# Patient Record
Sex: FEMALE | Race: ASIAN | NOT HISPANIC OR LATINO | URBAN - METROPOLITAN AREA
[De-identification: names, ages, dates, MRNs, and addresses within clinical notes are randomized per-mention and may not be internally consistent; named-entity substitution may affect disease eponyms.]

---

## 2017-06-28 ENCOUNTER — EMERGENCY (EMERGENCY)
Facility: HOSPITAL | Age: 42
LOS: 1 days | Discharge: HOME | End: 2017-06-28
Attending: EMERGENCY MEDICINE | Admitting: EMERGENCY MEDICINE
Payer: MEDICAID

## 2017-06-28 VITALS
OXYGEN SATURATION: 100 % | SYSTOLIC BLOOD PRESSURE: 115 MMHG | HEART RATE: 70 BPM | TEMPERATURE: 98 F | RESPIRATION RATE: 18 BRPM | DIASTOLIC BLOOD PRESSURE: 79 MMHG

## 2017-06-28 PROCEDURE — 99284 EMERGENCY DEPT VISIT MOD MDM: CPT | Mod: 25

## 2017-06-28 NOTE — ED ADULT TRIAGE NOTE - CHIEF COMPLAINT QUOTE
Pt comes in c/o allergic reaction to hair dye that started 2 days ago. Pt has swollen b/l swollen eyes, almost swollen shut, and swollen areas around hair line. Pt denies any CP or SOB, swelling of lips/tongue, itchy throat. Breathing is equal and unlabored on assessment, speaking in complete sentences, O2 sat 100% on RA. NAD noted.

## 2017-06-29 DIAGNOSIS — Z90.49 ACQUIRED ABSENCE OF OTHER SPECIFIED PARTS OF DIGESTIVE TRACT: Chronic | ICD-10-CM

## 2017-06-29 RX ORDER — DIPHENHYDRAMINE HCL 50 MG
50 CAPSULE ORAL ONCE
Qty: 0 | Refills: 0 | Status: COMPLETED | OUTPATIENT
Start: 2017-06-29 | End: 2017-06-29

## 2017-06-29 RX ORDER — FAMOTIDINE 10 MG/ML
20 INJECTION INTRAVENOUS ONCE
Qty: 0 | Refills: 0 | Status: COMPLETED | OUTPATIENT
Start: 2017-06-29 | End: 2017-06-29

## 2017-06-29 RX ADMIN — FAMOTIDINE 20 MILLIGRAM(S): 10 INJECTION INTRAVENOUS at 02:32

## 2017-06-29 RX ADMIN — Medication 125 MILLIGRAM(S): at 02:32

## 2017-06-29 RX ADMIN — Medication 50 MILLIGRAM(S): at 02:32

## 2017-06-29 NOTE — ED PROVIDER NOTE - ATTENDING CONTRIBUTION TO CARE
DR. STRAUSS, ATTENDING MD-  I performed a face to face bedside interview with patient regarding history of present illness, review of symptoms and past medical history. I completed an independent physical exam.  I have discussed patient's plan of care with the resident.   Documentation as above in the note.  HPI: 43 yo F with no Past Medical History that presents with allergic reaction, eye swelling x 2 days from possible hair dye. Had macarena hair dye bought OTC and started having symptoms. Denies SOB, wheezing, chest pain, abd pain, N/V/D, weakness. Saw PMD and given benadryl which was working but today not working and eyes welling, unable to see out of left eye secondary to swelling. No meds taken prior to arrival. Still has hair dye in hair despite trying to wash out 6 times. Has had this before to same hair dye 10 years ago. No other exposures.   EXAM: Hair dye in place and on scalp. Eyes bilateral swelling but eyes EOMI, PERRL, no conjunctivitis. Lungs ctab. no stridor, no wheezing, abd soft non tender. no rash/hives.   MDM: 43 yo F with no Past Medical History that presents with allergic reaction which only involves eye lid swelling on exam. Well appearing but still with hair dye in hair. Will need to wash out hair dye, provide meds including benadryl and steroids and reassess.

## 2017-06-29 NOTE — ED PROVIDER NOTE - OBJECTIVE STATEMENT
42F no sig PMH, p/w 2 days of facial swelling and itching after using macarena hair dye. Pt's eyes are swollen and limiting sight. Endorses mild SOB yesterday and some SOB today with laying down. Denies abdominal pain, n/v/d, sob, chest pain currently.  No hives. Pt has had this happen 10 years ago, was treated in [facility] with unknown med cocktail.      at bedside.

## 2017-06-29 NOTE — ED PROVIDER NOTE - CARE PLAN
Principal Discharge DX:	Allergic reaction, initial encounter  Instructions for follow-up, activity and diet:	Follow up with your primary care doctor in two days  - Take benadryl 50mg every 8 hours as needed for facial swelling or itching  - Take prednisone 40mg daily  - Use clarifying shampoo in your hair daily, if you cannot find one you can add baking soda to your regular shampoo.  - Return to the ED if you have severe swelling, shortness of breath, chest pain, shortness of breath, abdominal pain, vomiting, diarrhea or any other concerning symptom. Principal Discharge DX:	Allergic reaction, initial encounter  Instructions for follow-up, activity and diet:	Follow up with your primary care doctor in two days  - Take benadryl 50mg every 8 hours as needed for facial swelling or itching. If you take benadryl you should not breast feed for 12 hours after taking.  - Take prednisone 40mg daily. Wait 4 hours to breast feed after taking prednisone dose.  - Use clarifying shampoo in your hair daily, if you cannot find one you can add baking soda to your regular shampoo.  - Return to the ED if you have severe swelling, shortness of breath, chest pain, shortness of breath, abdominal pain, vomiting, diarrhea or any other concerning symptom.

## 2017-06-29 NOTE — ED ADULT NURSE NOTE - CHPI ED SYMPTOMS NEG
no difficulty breathing/no throat itching/no vomiting/no nausea/no rash/no difficulty swallowing/no shortness of breath/no cough/no wheezing

## 2017-06-29 NOTE — ED PROVIDER NOTE - MEDICAL DECISION MAKING DETAILS
42F PMH hair dye allergy p/w facial edema 2/2 hair dye use. Will give IV steroids, benadryl, pepcid Will attempt to wash out hairdye. Recommended shaving head however pt does not want to.

## 2017-06-29 NOTE — ED ADULT NURSE NOTE - OBJECTIVE STATEMENT
Pt arrives to ED s/p using hair dye 2 days ago.  Pt had allergic reaction with swelling to face and eyes.  Pt reports pain to area.  No respiratory distress observed or reported.  Pt is Telugu speaking and requests family at bedside to translate.  Pt is aware she is allergic to hair dye but tried a new brand.  Pt denies allergies to food or drugs.  Pt to be assessed by MD.

## 2017-06-29 NOTE — ED PROVIDER NOTE - PLAN OF CARE
Follow up with your primary care doctor in two days  - Take benadryl 50mg every 8 hours as needed for facial swelling or itching  - Take prednisone 40mg daily  - Use clarifying shampoo in your hair daily, if you cannot find one you can add baking soda to your regular shampoo.  - Return to the ED if you have severe swelling, shortness of breath, chest pain, shortness of breath, abdominal pain, vomiting, diarrhea or any other concerning symptom. Follow up with your primary care doctor in two days  - Take benadryl 50mg every 8 hours as needed for facial swelling or itching. If you take benadryl you should not breast feed for 12 hours after taking.  - Take prednisone 40mg daily. Wait 4 hours to breast feed after taking prednisone dose.  - Use clarifying shampoo in your hair daily, if you cannot find one you can add baking soda to your regular shampoo.  - Return to the ED if you have severe swelling, shortness of breath, chest pain, shortness of breath, abdominal pain, vomiting, diarrhea or any other concerning symptom.

## 2019-02-15 ENCOUNTER — EMERGENCY (EMERGENCY)
Facility: HOSPITAL | Age: 44
LOS: 1 days | Discharge: HOME | End: 2019-02-15
Attending: EMERGENCY MEDICINE | Admitting: EMERGENCY MEDICINE
Payer: MEDICAID

## 2019-02-15 VITALS
TEMPERATURE: 98 F | DIASTOLIC BLOOD PRESSURE: 88 MMHG | HEART RATE: 83 BPM | SYSTOLIC BLOOD PRESSURE: 122 MMHG | RESPIRATION RATE: 18 BRPM | OXYGEN SATURATION: 100 %

## 2019-02-15 DIAGNOSIS — Z90.49 ACQUIRED ABSENCE OF OTHER SPECIFIED PARTS OF DIGESTIVE TRACT: Chronic | ICD-10-CM

## 2019-02-15 LAB
ALBUMIN SERPL ELPH-MCNC: 3.9 G/DL — SIGNIFICANT CHANGE UP (ref 3.3–5)
ALP SERPL-CCNC: 75 U/L — SIGNIFICANT CHANGE UP (ref 40–120)
ALT FLD-CCNC: 34 U/L — HIGH (ref 4–33)
ANION GAP SERPL CALC-SCNC: 11 MMO/L — SIGNIFICANT CHANGE UP (ref 7–14)
APPEARANCE UR: CLEAR — SIGNIFICANT CHANGE UP
AST SERPL-CCNC: 27 U/L — SIGNIFICANT CHANGE UP (ref 4–32)
BASOPHILS # BLD AUTO: 0.02 K/UL — SIGNIFICANT CHANGE UP (ref 0–0.2)
BASOPHILS NFR BLD AUTO: 0.5 % — SIGNIFICANT CHANGE UP (ref 0–2)
BILIRUB SERPL-MCNC: < 0.2 MG/DL — LOW (ref 0.2–1.2)
BILIRUB UR-MCNC: NEGATIVE — SIGNIFICANT CHANGE UP
BLOOD UR QL VISUAL: NEGATIVE — SIGNIFICANT CHANGE UP
BUN SERPL-MCNC: 12 MG/DL — SIGNIFICANT CHANGE UP (ref 7–23)
CALCIUM SERPL-MCNC: 8.6 MG/DL — SIGNIFICANT CHANGE UP (ref 8.4–10.5)
CHLORIDE SERPL-SCNC: 104 MMOL/L — SIGNIFICANT CHANGE UP (ref 98–107)
CO2 SERPL-SCNC: 24 MMOL/L — SIGNIFICANT CHANGE UP (ref 22–31)
COLOR SPEC: COLORLESS — SIGNIFICANT CHANGE UP
CREAT SERPL-MCNC: 0.98 MG/DL — SIGNIFICANT CHANGE UP (ref 0.5–1.3)
EOSINOPHIL # BLD AUTO: 0.1 K/UL — SIGNIFICANT CHANGE UP (ref 0–0.5)
EOSINOPHIL NFR BLD AUTO: 2.5 % — SIGNIFICANT CHANGE UP (ref 0–6)
FLU A RESULT: NOT DETECTED — SIGNIFICANT CHANGE UP
FLU A RESULT: NOT DETECTED — SIGNIFICANT CHANGE UP
FLUAV AG NPH QL: NOT DETECTED — SIGNIFICANT CHANGE UP
FLUBV AG NPH QL: NOT DETECTED — SIGNIFICANT CHANGE UP
GLUCOSE SERPL-MCNC: 98 MG/DL — SIGNIFICANT CHANGE UP (ref 70–99)
GLUCOSE UR-MCNC: NEGATIVE — SIGNIFICANT CHANGE UP
HCT VFR BLD CALC: 34.9 % — SIGNIFICANT CHANGE UP (ref 34.5–45)
HGB BLD-MCNC: 11.4 G/DL — LOW (ref 11.5–15.5)
IMM GRANULOCYTES NFR BLD AUTO: 0.2 % — SIGNIFICANT CHANGE UP (ref 0–1.5)
KETONES UR-MCNC: NEGATIVE — SIGNIFICANT CHANGE UP
LEUKOCYTE ESTERASE UR-ACNC: NEGATIVE — SIGNIFICANT CHANGE UP
LYMPHOCYTES # BLD AUTO: 1.41 K/UL — SIGNIFICANT CHANGE UP (ref 1–3.3)
LYMPHOCYTES # BLD AUTO: 35 % — SIGNIFICANT CHANGE UP (ref 13–44)
MCHC RBC-ENTMCNC: 29.3 PG — SIGNIFICANT CHANGE UP (ref 27–34)
MCHC RBC-ENTMCNC: 32.7 % — SIGNIFICANT CHANGE UP (ref 32–36)
MCV RBC AUTO: 89.7 FL — SIGNIFICANT CHANGE UP (ref 80–100)
MONOCYTES # BLD AUTO: 0.31 K/UL — SIGNIFICANT CHANGE UP (ref 0–0.9)
MONOCYTES NFR BLD AUTO: 7.7 % — SIGNIFICANT CHANGE UP (ref 2–14)
NEUTROPHILS # BLD AUTO: 2.18 K/UL — SIGNIFICANT CHANGE UP (ref 1.8–7.4)
NEUTROPHILS NFR BLD AUTO: 54.1 % — SIGNIFICANT CHANGE UP (ref 43–77)
NITRITE UR-MCNC: NEGATIVE — SIGNIFICANT CHANGE UP
NRBC # FLD: 0 K/UL — LOW (ref 25–125)
PH UR: 8 — SIGNIFICANT CHANGE UP (ref 5–8)
PLATELET # BLD AUTO: 186 K/UL — SIGNIFICANT CHANGE UP (ref 150–400)
PMV BLD: 12.2 FL — SIGNIFICANT CHANGE UP (ref 7–13)
POTASSIUM SERPL-MCNC: 4.5 MMOL/L — SIGNIFICANT CHANGE UP (ref 3.5–5.3)
POTASSIUM SERPL-SCNC: 4.5 MMOL/L — SIGNIFICANT CHANGE UP (ref 3.5–5.3)
PROT SERPL-MCNC: 7 G/DL — SIGNIFICANT CHANGE UP (ref 6–8.3)
PROT UR-MCNC: NEGATIVE — SIGNIFICANT CHANGE UP
RBC # BLD: 3.89 M/UL — SIGNIFICANT CHANGE UP (ref 3.8–5.2)
RBC # FLD: 12.6 % — SIGNIFICANT CHANGE UP (ref 10.3–14.5)
RSV RESULT: SIGNIFICANT CHANGE UP
RSV RNA RESP QL NAA+PROBE: SIGNIFICANT CHANGE UP
SODIUM SERPL-SCNC: 139 MMOL/L — SIGNIFICANT CHANGE UP (ref 135–145)
SP GR SPEC: 1.01 — SIGNIFICANT CHANGE UP (ref 1–1.04)
UROBILINOGEN FLD QL: NORMAL — SIGNIFICANT CHANGE UP
WBC # BLD: 4.03 K/UL — SIGNIFICANT CHANGE UP (ref 3.8–10.5)
WBC # FLD AUTO: 4.03 K/UL — SIGNIFICANT CHANGE UP (ref 3.8–10.5)

## 2019-02-15 PROCEDURE — 99284 EMERGENCY DEPT VISIT MOD MDM: CPT

## 2019-02-15 PROCEDURE — 71046 X-RAY EXAM CHEST 2 VIEWS: CPT | Mod: 26

## 2019-02-15 RX ADMIN — Medication 100 MILLIGRAM(S): at 19:14

## 2019-02-15 NOTE — ED PROVIDER NOTE - CLINICAL SUMMARY MEDICAL DECISION MAKING FREE TEXT BOX
Healthy female here with URI symptoms:   -will check flu, RSV, chest xray, give tesslon pearls for cough, reassess

## 2019-02-15 NOTE — ED ADULT TRIAGE NOTE - CHIEF COMPLAINT QUOTE
Pt Serbian speaking, ok for son to translate as per pt.  Pt c/o cough with SOB and CP x1 week and got worse this afternoon.  Denies fever.  Appears comfortable

## 2019-02-15 NOTE — ED PROVIDER NOTE - OBJECTIVE STATEMENT
43 yo F with no PMHx here with productive cough with white sputum, chest tightness, shortness of breath x 6 days. The pt says she began having a fever 6 days which was relieved with motrin. Denies rigors, palpitations, n/v, abdominal pain, paresthesias, weakness. No sick contacts. No recent travel.

## 2019-02-17 LAB
BACTERIA UR CULT: SIGNIFICANT CHANGE UP
SPECIMEN SOURCE: SIGNIFICANT CHANGE UP

## 2021-07-02 PROBLEM — Z00.00 ENCOUNTER FOR PREVENTIVE HEALTH EXAMINATION: Status: ACTIVE | Noted: 2021-07-02

## 2021-07-15 ENCOUNTER — APPOINTMENT (OUTPATIENT)
Dept: CARDIOLOGY | Facility: CLINIC | Age: 46
End: 2021-07-15
Payer: MEDICAID

## 2021-07-15 ENCOUNTER — NON-APPOINTMENT (OUTPATIENT)
Age: 46
End: 2021-07-15

## 2021-07-15 VITALS
OXYGEN SATURATION: 98 % | TEMPERATURE: 97.7 F | HEART RATE: 66 BPM | SYSTOLIC BLOOD PRESSURE: 116 MMHG | HEIGHT: 61 IN | WEIGHT: 166 LBS | DIASTOLIC BLOOD PRESSURE: 86 MMHG | BODY MASS INDEX: 31.34 KG/M2

## 2021-07-15 DIAGNOSIS — Z82.49 FAMILY HISTORY OF ISCHEMIC HEART DISEASE AND OTHER DISEASES OF THE CIRCULATORY SYSTEM: ICD-10-CM

## 2021-07-15 DIAGNOSIS — Z87.09 PERSONAL HISTORY OF OTHER DISEASES OF THE RESPIRATORY SYSTEM: ICD-10-CM

## 2021-07-15 DIAGNOSIS — Z83.3 FAMILY HISTORY OF DIABETES MELLITUS: ICD-10-CM

## 2021-07-15 DIAGNOSIS — R06.02 SHORTNESS OF BREATH: ICD-10-CM

## 2021-07-15 DIAGNOSIS — Z83.438 FAMILY HISTORY OF OTHER DISORDER OF LIPOPROTEIN METABOLISM AND OTHER LIPIDEMIA: ICD-10-CM

## 2021-07-15 DIAGNOSIS — Z78.9 OTHER SPECIFIED HEALTH STATUS: ICD-10-CM

## 2021-07-15 PROCEDURE — 93000 ELECTROCARDIOGRAM COMPLETE: CPT

## 2021-07-15 PROCEDURE — 93306 TTE W/DOPPLER COMPLETE: CPT

## 2021-07-15 PROCEDURE — 99204 OFFICE O/P NEW MOD 45 MIN: CPT

## 2021-07-15 RX ORDER — CALCIUM CITRATE/VITAMIN D3 315MG-6.25
TABLET ORAL
Refills: 0 | Status: ACTIVE | COMMUNITY

## 2021-07-15 NOTE — HISTORY OF PRESENT ILLNESS
[FreeTextEntry1] : Yuan Márquez is a 46 year old female with history of on and off hypertension not on medications comes for cardiac evaluation. Complaining of random onset of mild shortness of breath and chest discomfort which is relieved by itself in few minutes and chronic. No exertional shortness of breath or chest pains. No palpitations. Takes Calcium tablets and some Asthma medication, does not remember the name. Says eats healthy. Occasional on and off brief episodes of dizziness . No syncope.

## 2021-07-15 NOTE — DISCUSSION/SUMMARY
[FreeTextEntry1] : In a summary Yuan Márquez is a middle aged female with on and off Hypertension, when she eats outside food,  not taking any medication, today BP normal. Monitor BP regularly. Low salt diet. Avoid outside food. Shortness of breath and chest discomfort , and on and off dizziness, when BP high ,echo done showed normal LV systolic function and mild TR. Healthy lifestyle. Exercises. Follow up in 6 months.

## 2021-07-15 NOTE — REVIEW OF SYSTEMS
[SOB] : shortness of breath [Chest Discomfort] : chest discomfort [Dizziness] : dizziness [Negative] : Respiratory [Blurry Vision] : no blurred vision [Earache] : no earache [Lower Ext Edema] : no extremity edema [Palpitations] : no palpitations [Orthopnea] : no orthopnea [PND] : no PND [Abdominal Pain] : no abdominal pain [Nausea] : no nausea [Vomiting] : no vomiting [Heartburn] : no heartburn [Dysuria] : no dysuria [Joint Pain] : no joint pain [Rash] : no rash [Itching] : no itching [Tremor] : no tremor was seen [Numbness (Hypoesthesia)] : no numbness [Tingling (Paresthesia)] : no tingling [Confusion] : no confusion was observed [Anxiety] : no anxiety [Under Stress] : not under stress [Easy Bleeding] : no tendency for easy bleeding [Easy Bruising] : no tendency for easy bruising

## 2022-01-19 ENCOUNTER — NON-APPOINTMENT (OUTPATIENT)
Age: 47
End: 2022-01-19

## 2022-01-19 ENCOUNTER — APPOINTMENT (OUTPATIENT)
Dept: CARDIOLOGY | Facility: CLINIC | Age: 47
End: 2022-01-19
Payer: MEDICAID

## 2022-01-19 VITALS
WEIGHT: 166 LBS | HEIGHT: 61 IN | OXYGEN SATURATION: 98 % | HEART RATE: 66 BPM | SYSTOLIC BLOOD PRESSURE: 110 MMHG | BODY MASS INDEX: 31.34 KG/M2 | TEMPERATURE: 98.3 F | DIASTOLIC BLOOD PRESSURE: 78 MMHG

## 2022-01-19 DIAGNOSIS — R42 DIZZINESS AND GIDDINESS: ICD-10-CM

## 2022-01-19 DIAGNOSIS — Z86.79 PERSONAL HISTORY OF OTHER DISEASES OF THE CIRCULATORY SYSTEM: ICD-10-CM

## 2022-01-19 PROCEDURE — 93000 ELECTROCARDIOGRAM COMPLETE: CPT

## 2022-01-19 PROCEDURE — 99213 OFFICE O/P EST LOW 20 MIN: CPT

## 2022-01-19 NOTE — DISCUSSION/SUMMARY
[FreeTextEntry1] : In a summary Yuan Márquez is a middle aged female with on and off dizziness and episodes of BP on low side, eat regular food. Drink plenty of fluids.  Healthy lifestyle. Exercises. Follow up as needed.

## 2022-01-19 NOTE — PHYSICAL EXAM
APER [Normal Conjunctiva] : normal conjunctiva [No Carotid Bruit] : no carotid bruit [Soft] : abdomen soft [Non Tender] : non-tender [Normal Bowel Sounds] : normal bowel sounds [Normal] : alert and oriented, normal memory

## 2022-01-19 NOTE — REVIEW OF SYSTEMS
[Dizziness] : dizziness [Negative] : Respiratory [Blurry Vision] : no blurred vision [Earache] : no earache [SOB] : no shortness of breath [Chest Discomfort] : no chest discomfort [Lower Ext Edema] : no extremity edema [Palpitations] : no palpitations [Orthopnea] : no orthopnea [PND] : no PND [Abdominal Pain] : no abdominal pain [Nausea] : no nausea [Vomiting] : no vomiting [Heartburn] : no heartburn [Dysuria] : no dysuria [Joint Pain] : no joint pain [Rash] : no rash [Itching] : no itching [Tremor] : no tremor was seen [Numbness (Hypoesthesia)] : no numbness [Tingling (Paresthesia)] : no tingling [Confusion] : no confusion was observed [Anxiety] : no anxiety [Under Stress] : not under stress [Easy Bleeding] : no tendency for easy bleeding [Easy Bruising] : no tendency for easy bruising

## 2022-09-29 ENCOUNTER — INPATIENT (INPATIENT)
Facility: HOSPITAL | Age: 47
LOS: 0 days | Discharge: LEFT BEFORE DISCHARGE | End: 2022-09-30
Attending: STUDENT IN AN ORGANIZED HEALTH CARE EDUCATION/TRAINING PROGRAM | Admitting: STUDENT IN AN ORGANIZED HEALTH CARE EDUCATION/TRAINING PROGRAM
Payer: MEDICAID

## 2022-09-29 VITALS
RESPIRATION RATE: 16 BRPM | SYSTOLIC BLOOD PRESSURE: 136 MMHG | OXYGEN SATURATION: 100 % | HEART RATE: 68 BPM | TEMPERATURE: 98 F | DIASTOLIC BLOOD PRESSURE: 79 MMHG

## 2022-09-29 DIAGNOSIS — Z90.49 ACQUIRED ABSENCE OF OTHER SPECIFIED PARTS OF DIGESTIVE TRACT: Chronic | ICD-10-CM

## 2022-09-29 LAB
ALBUMIN SERPL ELPH-MCNC: 4 G/DL — SIGNIFICANT CHANGE UP (ref 3.3–5)
ALP SERPL-CCNC: 74 U/L — SIGNIFICANT CHANGE UP (ref 40–120)
ALT FLD-CCNC: 30 U/L — SIGNIFICANT CHANGE UP (ref 4–33)
ANION GAP SERPL CALC-SCNC: 8 MMOL/L — SIGNIFICANT CHANGE UP (ref 7–14)
APPEARANCE UR: CLEAR — SIGNIFICANT CHANGE UP
AST SERPL-CCNC: 31 U/L — SIGNIFICANT CHANGE UP (ref 4–32)
BASOPHILS # BLD AUTO: 0.03 K/UL — SIGNIFICANT CHANGE UP (ref 0–0.2)
BASOPHILS NFR BLD AUTO: 0.8 % — SIGNIFICANT CHANGE UP (ref 0–2)
BILIRUB SERPL-MCNC: <0.2 MG/DL — SIGNIFICANT CHANGE UP (ref 0.2–1.2)
BILIRUB UR-MCNC: NEGATIVE — SIGNIFICANT CHANGE UP
BUN SERPL-MCNC: 10 MG/DL — SIGNIFICANT CHANGE UP (ref 7–23)
CALCIUM SERPL-MCNC: 8.7 MG/DL — SIGNIFICANT CHANGE UP (ref 8.4–10.5)
CHLORIDE SERPL-SCNC: 102 MMOL/L — SIGNIFICANT CHANGE UP (ref 98–107)
CO2 SERPL-SCNC: 24 MMOL/L — SIGNIFICANT CHANGE UP (ref 22–31)
COLOR SPEC: SIGNIFICANT CHANGE UP
CREAT SERPL-MCNC: 0.69 MG/DL — SIGNIFICANT CHANGE UP (ref 0.5–1.3)
DIFF PNL FLD: NEGATIVE — SIGNIFICANT CHANGE UP
EGFR: 109 ML/MIN/1.73M2 — SIGNIFICANT CHANGE UP
EOSINOPHIL # BLD AUTO: 0.06 K/UL — SIGNIFICANT CHANGE UP (ref 0–0.5)
EOSINOPHIL NFR BLD AUTO: 1.6 % — SIGNIFICANT CHANGE UP (ref 0–6)
GLUCOSE SERPL-MCNC: 88 MG/DL — SIGNIFICANT CHANGE UP (ref 70–99)
GLUCOSE UR QL: NEGATIVE — SIGNIFICANT CHANGE UP
HCT VFR BLD CALC: 37.1 % — SIGNIFICANT CHANGE UP (ref 34.5–45)
HGB BLD-MCNC: 12.4 G/DL — SIGNIFICANT CHANGE UP (ref 11.5–15.5)
IANC: 1.86 K/UL — SIGNIFICANT CHANGE UP (ref 1.8–7.4)
IMM GRANULOCYTES NFR BLD AUTO: 0.3 % — SIGNIFICANT CHANGE UP (ref 0–0.9)
KETONES UR-MCNC: NEGATIVE — SIGNIFICANT CHANGE UP
LEUKOCYTE ESTERASE UR-ACNC: NEGATIVE — SIGNIFICANT CHANGE UP
LYMPHOCYTES # BLD AUTO: 1.57 K/UL — SIGNIFICANT CHANGE UP (ref 1–3.3)
LYMPHOCYTES # BLD AUTO: 40.6 % — SIGNIFICANT CHANGE UP (ref 13–44)
MCHC RBC-ENTMCNC: 29.8 PG — SIGNIFICANT CHANGE UP (ref 27–34)
MCHC RBC-ENTMCNC: 33.4 GM/DL — SIGNIFICANT CHANGE UP (ref 32–36)
MCV RBC AUTO: 89.2 FL — SIGNIFICANT CHANGE UP (ref 80–100)
MONOCYTES # BLD AUTO: 0.34 K/UL — SIGNIFICANT CHANGE UP (ref 0–0.9)
MONOCYTES NFR BLD AUTO: 8.8 % — SIGNIFICANT CHANGE UP (ref 2–14)
NEUTROPHILS # BLD AUTO: 1.86 K/UL — SIGNIFICANT CHANGE UP (ref 1.8–7.4)
NEUTROPHILS NFR BLD AUTO: 47.9 % — SIGNIFICANT CHANGE UP (ref 43–77)
NITRITE UR-MCNC: NEGATIVE — SIGNIFICANT CHANGE UP
NRBC # BLD: 0 /100 WBCS — SIGNIFICANT CHANGE UP (ref 0–0)
NRBC # FLD: 0 K/UL — SIGNIFICANT CHANGE UP (ref 0–0)
PH UR: 8 — SIGNIFICANT CHANGE UP (ref 5–8)
PLATELET # BLD AUTO: 196 K/UL — SIGNIFICANT CHANGE UP (ref 150–400)
POTASSIUM SERPL-MCNC: 4.1 MMOL/L — SIGNIFICANT CHANGE UP (ref 3.5–5.3)
POTASSIUM SERPL-SCNC: 4.1 MMOL/L — SIGNIFICANT CHANGE UP (ref 3.5–5.3)
PROT SERPL-MCNC: 7 G/DL — SIGNIFICANT CHANGE UP (ref 6–8.3)
PROT UR-MCNC: NEGATIVE — SIGNIFICANT CHANGE UP
RBC # BLD: 4.16 M/UL — SIGNIFICANT CHANGE UP (ref 3.8–5.2)
RBC # FLD: 12.8 % — SIGNIFICANT CHANGE UP (ref 10.3–14.5)
SODIUM SERPL-SCNC: 134 MMOL/L — LOW (ref 135–145)
SP GR SPEC: 1.01 — LOW (ref 1.01–1.05)
TROPONIN T, HIGH SENSITIVITY RESULT: <6 NG/L — SIGNIFICANT CHANGE UP
UROBILINOGEN FLD QL: SIGNIFICANT CHANGE UP
WBC # BLD: 3.87 K/UL — SIGNIFICANT CHANGE UP (ref 3.8–10.5)
WBC # FLD AUTO: 3.87 K/UL — SIGNIFICANT CHANGE UP (ref 3.8–10.5)

## 2022-09-29 PROCEDURE — 93010 ELECTROCARDIOGRAM REPORT: CPT

## 2022-09-29 PROCEDURE — 70496 CT ANGIOGRAPHY HEAD: CPT | Mod: 26,MF

## 2022-09-29 PROCEDURE — 99285 EMERGENCY DEPT VISIT HI MDM: CPT | Mod: 25

## 2022-09-29 PROCEDURE — 70498 CT ANGIOGRAPHY NECK: CPT | Mod: 26,MG

## 2022-09-29 PROCEDURE — G1004: CPT

## 2022-09-29 RX ORDER — ACETAMINOPHEN 500 MG
975 TABLET ORAL ONCE
Refills: 0 | Status: COMPLETED | OUTPATIENT
Start: 2022-09-29 | End: 2022-09-29

## 2022-09-29 RX ORDER — SODIUM CHLORIDE 9 MG/ML
1000 INJECTION INTRAMUSCULAR; INTRAVENOUS; SUBCUTANEOUS ONCE
Refills: 0 | Status: COMPLETED | OUTPATIENT
Start: 2022-09-29 | End: 2022-09-29

## 2022-09-29 RX ORDER — MECLIZINE HCL 12.5 MG
25 TABLET ORAL ONCE
Refills: 0 | Status: COMPLETED | OUTPATIENT
Start: 2022-09-29 | End: 2022-09-29

## 2022-09-29 RX ADMIN — Medication 25 MILLIGRAM(S): at 19:06

## 2022-09-29 RX ADMIN — SODIUM CHLORIDE 1000 MILLILITER(S): 9 INJECTION INTRAMUSCULAR; INTRAVENOUS; SUBCUTANEOUS at 19:06

## 2022-09-29 RX ADMIN — Medication 975 MILLIGRAM(S): at 19:05

## 2022-09-29 NOTE — ED ADULT NURSE NOTE - INTERPRETER NAME
EXAMINATION TYPE: XR chest 2V

 

DATE OF EXAM: 1/5/2018

 

COMPARISON: 12/17/2017

 

HISTORY: Chest pain

 

TECHNIQUE:  Frontal and lateral views of the chest are obtained.

 

FINDINGS:  

 

There is no focal air space opacity.

 

No evidence for pneumothorax.  No pleural effusion.

 

The cardiac silhouette size is within normal limits.

 

The osseous structures are grossly intact.

 

IMPRESSION:  

 

1.  No acute cardiopulmonary process.
Placido

## 2022-09-29 NOTE — ED ADULT TRIAGE NOTE - CHIEF COMPLAINT QUOTE
Pt c/o dizziness x 3 days. Endorses room-spinning sensation, worse with positional changes, generalized weakness and lethargy. No headache, blurry vision, nausea/vomiting, fever/chills. PMHx Asthma

## 2022-09-29 NOTE — ED PROVIDER NOTE - PRINCIPAL DIAGNOSIS
Speech therapy    Orders received and chart reviewed. Spoke with RN. Note patient is currently having a Ihsan placed. Will follow up later today as appropriate. Thank you. Imani Jorgensen M.S., CCC-SLP Dizziness

## 2022-09-29 NOTE — ED PROVIDER NOTE - ATTENDING APP SHARED VISIT CONTRIBUTION OF CARE
harris: 48 yo woman, appears oldr than stated age looks uncomfortable with dizziness and headache.  pt with some nystagmus to the right.  abd soft.  treating HA but will also image.    I performed a history and physical exam of the patient and discussed their management with the resident and /or advanced care provider. I reviewed the resident and /or ACP's note and agree with the documented findings and plan of care. My medical decison making and observations are found above.

## 2022-09-29 NOTE — ED ADULT NURSE NOTE - OBJECTIVE STATEMENT
Received pt to intake 12, A+Ox4, ambulatory. C/O intermittent dizziness x 1 year, most recent episode x 2 days. pt endorsing ear ringing and nausea. Patient with cranial nerves intact, equal strength bilaterally, sensation equal bilaterally, no facial droop, clear and coherent speech, intact finger to nose, negative pronator drift. Pt denies any chest pain, SOB, headache, N/V/D, fever, chills.  20G to AC, Labs sent, Medicated as per MD, will continue to monitor.

## 2022-09-29 NOTE — ED PROVIDER NOTE - CLINICAL SUMMARY MEDICAL DECISION MAKING FREE TEXT BOX
48 yo female c pmhx asthma presents to ED c/o dizziness and headache x 2 days.  neuro intact, pt ill appearing, will get CTA head/neck, give fluids, meclizine, tylenol and reassess 48 yo female c pmhx asthma presents to ED c/o dizziness and headache x 2 days.  neuro intact, pt ill appearing, will get CTA head/neck, give fluids, meclizine, tylenol and reassess    marimarughey: 48 yo woman, appears oldr than stated age looks uncomfortable with dizziness and headache.  pt with some nystagmus to the right.  abd soft.  treating HA but will also image.

## 2022-09-29 NOTE — ED PROVIDER NOTE - NS ED ROS FT
ROS:  GENERAL: No fever, no chills  EYES: no change in vision  HEENT: no trouble swallowing, no trouble speaking  CARDIAC: no chest pain  PULMONARY: no cough, no shortness of breath  GI: no abdominal pain, no nausea, no vomiting, no diarrhea, no constipation  : No dysuria, no frequency, no change in appearance, or odor of urine  SKIN: no rashes  NEURO: +headache +dizziness  MSK: No joint pain

## 2022-09-29 NOTE — ED PROVIDER NOTE - OBJECTIVE STATEMENT
48 yo female c pmhx asthma presents to ED c/o dizziness and headache x 2 days.  Pt describes dizziness as room spinning, worse when moving head and changing position.  States has had same symptoms intermittently for the past year however has not been evaluated for it.  +tinnitus in both ears.   denies any numbness, weakness, fever, cp, sob, abd pain, n/v, leg swelling or recent travel.

## 2022-09-30 ENCOUNTER — TRANSCRIPTION ENCOUNTER (OUTPATIENT)
Age: 47
End: 2022-09-30

## 2022-09-30 VITALS
TEMPERATURE: 99 F | DIASTOLIC BLOOD PRESSURE: 66 MMHG | HEART RATE: 70 BPM | SYSTOLIC BLOOD PRESSURE: 100 MMHG | RESPIRATION RATE: 20 BRPM | OXYGEN SATURATION: 100 %

## 2022-09-30 DIAGNOSIS — R42 DIZZINESS AND GIDDINESS: ICD-10-CM

## 2022-09-30 DIAGNOSIS — J45.909 UNSPECIFIED ASTHMA, UNCOMPLICATED: ICD-10-CM

## 2022-09-30 DIAGNOSIS — Z29.9 ENCOUNTER FOR PROPHYLACTIC MEASURES, UNSPECIFIED: ICD-10-CM

## 2022-09-30 LAB
ANION GAP SERPL CALC-SCNC: 10 MMOL/L — SIGNIFICANT CHANGE UP (ref 7–14)
B PERT DNA SPEC QL NAA+PROBE: SIGNIFICANT CHANGE UP
B PERT+PARAPERT DNA PNL SPEC NAA+PROBE: SIGNIFICANT CHANGE UP
BORDETELLA PARAPERTUSSIS (RAPRVP): SIGNIFICANT CHANGE UP
BUN SERPL-MCNC: 8 MG/DL — SIGNIFICANT CHANGE UP (ref 7–23)
C PNEUM DNA SPEC QL NAA+PROBE: SIGNIFICANT CHANGE UP
CALCIUM SERPL-MCNC: 8.5 MG/DL — SIGNIFICANT CHANGE UP (ref 8.4–10.5)
CHLORIDE SERPL-SCNC: 105 MMOL/L — SIGNIFICANT CHANGE UP (ref 98–107)
CO2 SERPL-SCNC: 24 MMOL/L — SIGNIFICANT CHANGE UP (ref 22–31)
CREAT SERPL-MCNC: 0.55 MG/DL — SIGNIFICANT CHANGE UP (ref 0.5–1.3)
EGFR: 115 ML/MIN/1.73M2 — SIGNIFICANT CHANGE UP
FLUAV SUBTYP SPEC NAA+PROBE: SIGNIFICANT CHANGE UP
FLUBV RNA SPEC QL NAA+PROBE: SIGNIFICANT CHANGE UP
FOLATE SERPL-MCNC: 10.7 NG/ML — SIGNIFICANT CHANGE UP (ref 3.1–17.5)
GLUCOSE SERPL-MCNC: 87 MG/DL — SIGNIFICANT CHANGE UP (ref 70–99)
HADV DNA SPEC QL NAA+PROBE: SIGNIFICANT CHANGE UP
HCOV 229E RNA SPEC QL NAA+PROBE: SIGNIFICANT CHANGE UP
HCOV HKU1 RNA SPEC QL NAA+PROBE: SIGNIFICANT CHANGE UP
HCOV NL63 RNA SPEC QL NAA+PROBE: SIGNIFICANT CHANGE UP
HCOV OC43 RNA SPEC QL NAA+PROBE: SIGNIFICANT CHANGE UP
HCT VFR BLD CALC: 35.8 % — SIGNIFICANT CHANGE UP (ref 34.5–45)
HGB BLD-MCNC: 12.1 G/DL — SIGNIFICANT CHANGE UP (ref 11.5–15.5)
HMPV RNA SPEC QL NAA+PROBE: SIGNIFICANT CHANGE UP
HPIV1 RNA SPEC QL NAA+PROBE: SIGNIFICANT CHANGE UP
HPIV2 RNA SPEC QL NAA+PROBE: SIGNIFICANT CHANGE UP
HPIV3 RNA SPEC QL NAA+PROBE: SIGNIFICANT CHANGE UP
HPIV4 RNA SPEC QL NAA+PROBE: SIGNIFICANT CHANGE UP
INR BLD: 1.44 RATIO — HIGH (ref 0.88–1.16)
M PNEUMO DNA SPEC QL NAA+PROBE: SIGNIFICANT CHANGE UP
MAGNESIUM SERPL-MCNC: 1.8 MG/DL — SIGNIFICANT CHANGE UP (ref 1.6–2.6)
MCHC RBC-ENTMCNC: 30.1 PG — SIGNIFICANT CHANGE UP (ref 27–34)
MCHC RBC-ENTMCNC: 33.8 GM/DL — SIGNIFICANT CHANGE UP (ref 32–36)
MCV RBC AUTO: 89.1 FL — SIGNIFICANT CHANGE UP (ref 80–100)
NRBC # BLD: 0 /100 WBCS — SIGNIFICANT CHANGE UP (ref 0–0)
NRBC # FLD: 0 K/UL — SIGNIFICANT CHANGE UP (ref 0–0)
PHOSPHATE SERPL-MCNC: 3.1 MG/DL — SIGNIFICANT CHANGE UP (ref 2.5–4.5)
PLATELET # BLD AUTO: 176 K/UL — SIGNIFICANT CHANGE UP (ref 150–400)
POTASSIUM SERPL-MCNC: 4 MMOL/L — SIGNIFICANT CHANGE UP (ref 3.5–5.3)
POTASSIUM SERPL-SCNC: 4 MMOL/L — SIGNIFICANT CHANGE UP (ref 3.5–5.3)
PROTHROM AB SERPL-ACNC: 16.8 SEC — HIGH (ref 10.5–13.4)
RAPID RVP RESULT: SIGNIFICANT CHANGE UP
RBC # BLD: 4.02 M/UL — SIGNIFICANT CHANGE UP (ref 3.8–5.2)
RBC # FLD: 12.8 % — SIGNIFICANT CHANGE UP (ref 10.3–14.5)
RSV RNA SPEC QL NAA+PROBE: SIGNIFICANT CHANGE UP
RV+EV RNA SPEC QL NAA+PROBE: SIGNIFICANT CHANGE UP
SARS-COV-2 RNA SPEC QL NAA+PROBE: SIGNIFICANT CHANGE UP
SODIUM SERPL-SCNC: 139 MMOL/L — SIGNIFICANT CHANGE UP (ref 135–145)
TSH SERPL-MCNC: 3.11 UIU/ML — SIGNIFICANT CHANGE UP (ref 0.27–4.2)
VIT B12 SERPL-MCNC: 683 PG/ML — SIGNIFICANT CHANGE UP (ref 200–900)
WBC # BLD: 3.2 K/UL — LOW (ref 3.8–10.5)
WBC # FLD AUTO: 3.2 K/UL — LOW (ref 3.8–10.5)

## 2022-09-30 PROCEDURE — 99223 1ST HOSP IP/OBS HIGH 75: CPT

## 2022-09-30 PROCEDURE — 12345: CPT | Mod: NC

## 2022-09-30 RX ORDER — ALBUTEROL 90 UG/1
2 AEROSOL, METERED ORAL
Qty: 0 | Refills: 0 | DISCHARGE

## 2022-09-30 RX ORDER — LORATADINE 10 MG/1
1 TABLET ORAL
Qty: 0 | Refills: 0 | DISCHARGE

## 2022-09-30 RX ORDER — ONDANSETRON 8 MG/1
4 TABLET, FILM COATED ORAL EVERY 8 HOURS
Refills: 0 | Status: DISCONTINUED | OUTPATIENT
Start: 2022-09-30 | End: 2022-09-30

## 2022-09-30 RX ORDER — MECLIZINE HCL 12.5 MG
25 TABLET ORAL EVERY 8 HOURS
Refills: 0 | Status: DISCONTINUED | OUTPATIENT
Start: 2022-09-30 | End: 2022-09-30

## 2022-09-30 RX ORDER — CHOLECALCIFEROL (VITAMIN D3) 125 MCG
0 CAPSULE ORAL
Qty: 0 | Refills: 0 | DISCHARGE

## 2022-09-30 RX ORDER — LORATADINE 10 MG/1
10 TABLET ORAL DAILY
Refills: 0 | Status: DISCONTINUED | OUTPATIENT
Start: 2022-09-30 | End: 2022-09-30

## 2022-09-30 RX ORDER — ACETAMINOPHEN 500 MG
650 TABLET ORAL EVERY 6 HOURS
Refills: 0 | Status: DISCONTINUED | OUTPATIENT
Start: 2022-09-30 | End: 2022-09-30

## 2022-09-30 RX ORDER — ALBUTEROL 90 UG/1
2 AEROSOL, METERED ORAL EVERY 6 HOURS
Refills: 0 | Status: DISCONTINUED | OUTPATIENT
Start: 2022-09-30 | End: 2022-09-30

## 2022-09-30 RX ADMIN — ALBUTEROL 2 PUFF(S): 90 AEROSOL, METERED ORAL at 09:37

## 2022-09-30 RX ADMIN — LORATADINE 10 MILLIGRAM(S): 10 TABLET ORAL at 09:42

## 2022-09-30 NOTE — PROGRESS NOTE ADULT - PROBLEM SELECTOR PLAN 1
Pt presenting with intermittent dizziness (room spinning), tinnitus and frontal headache (happening about 20 times in the last 2 years).  -differential includes peripheral etiology like BPPV vs meniere disease vs vestibular migraine vs central etiology such as cva. Pt reports she has had MRis done in the past which did not show much.   CT head, CTA head/neck negative. orthostatics negative.   -Currently dizziness is improving although still somewhat present  -continue meclizine and zofran prn  -neurology consulted, will f/u recs.   -fu MR head w/wo contrast  -physical therapy consult  -neuro checks

## 2022-09-30 NOTE — PHYSICAL THERAPY INITIAL EVALUATION ADULT - PERTINENT HX OF CURRENT PROBLEM, REHAB EVAL
47 year old woman w/ asthma and recurrent episodes of vertigo presents to the ER with 2 days of dizziness (room spinning), tinnitus and frontal headache. Admitted for further evaluation

## 2022-09-30 NOTE — CONSULT NOTE ADULT - ATTENDING COMMENTS
Exam:  No skew, no nystagmus. Cautious gait.  No limb ataxia.     A/P  Ms. Palumbo is a 46 yo woman with recurrent episodes of vertigo - peripheral vertigo vs. migraine.   I agree with work up and management as above.     Thank you    Neurology signing off. Please reconsult PRN or call [facility] with any questions or after MRI are completed.

## 2022-09-30 NOTE — PROGRESS NOTE ADULT - ASSESSMENT
46 y/o woman w/ asthma and recurrent episodes of vertigo presents to the ER with 2 days of dizziness (room spinning), tinnitus and frontal headache. Admitted for further evaluation.

## 2022-09-30 NOTE — PHYSICAL THERAPY INITIAL EVALUATION ADULT - ADDITIONAL COMMENTS
Pt lives in a private house with , +1 flight to 2nd floor, was independent with all mobility prior to admission without use of an assistive device.     Pt left semi-supine on stretcher, all lines intact, comfortable and in NAD.

## 2022-09-30 NOTE — DISCHARGE NOTE PROVIDER - NSDCMRMEDTOKEN_GEN_ALL_CORE_FT
Albuterol (Eqv-ProAir HFA) 90 mcg/inh inhalation aerosol: 2 puff(s) inhaled every 6 hours  loratadine 10 mg oral tablet: 1 tab(s) orally once a day  Vitamin D3:

## 2022-09-30 NOTE — PHYSICAL THERAPY INITIAL EVALUATION ADULT - NSPTDISCHREC_GEN_A_CORE
Pt. not placed on skilled therapy services secondary to pt. performing at their baseline functional mobility performance. anticipate discharge to home with skilled outpatient vestibular PT services/Outpatient PT

## 2022-09-30 NOTE — CHART NOTE - NSCHARTNOTEFT_GEN_A_CORE
Called by nurse to evaluate patient who wishes to leave the hospital against medical advice. Patient is Yi speaking, her  Mr. Sheldon Harris was at bedside to provide translation per patient request. The patient is A&O x4 and has full capacity to make her own medical decisions. She was informed of her medical conditions, benefits, and alternatives to treatment as well as the risks of refusing treatment and the seriousness of leaving against medical advice such as the risks of heart attack, stroke, seizure, and even death were fully explained to the patient and her .  After expressing full understanding, the patient signed out against medical advice witnessed by the nurse.  The patient was informed to follow-up with PCP as soon as possible, and to arrange for outpatient MRI. Patient's  stated they will see PCP on Monday. Attending made aware.

## 2022-09-30 NOTE — CONSULT NOTE ADULT - ASSESSMENT
46yo R-handed Belarusian-speaking woman with PMH of asthma and episodic vertigo presents to the ED with room-spinning dizziness for the past 2 days, associated with a frontal headache, tinnitus, and intermittent blurry vision. Patient reportedly has had similar symptoms which self-resolve after 2 days for the past 2 years. Physical exam remarkable for cautious non-ataxic gait, Romberg negative, decreased strength in the triceps, and hyperreflexia in the UEs. Labs grossly WNL. CTH with no acute pathology and CTA head/neck with patent vasculature. NIHSS 0. Pre-MRS 0.    Impression: Acute on chronic vertigo with a headache likely due to peripheral etiology such as vestibular neuritis vs. migrainous vertigo. Lower suspicion for central etiology including demyelinating lesion or ischemic CVA of the posterior circulation.    Recommendations:  [] MR brain w/w/o contrast  [] Orthostatic vital signs  [] Will defer ASA due to lower suspicion for CVA  [] Symptomatic management with meclizine 12.5-25mg q6h PRN and acetaminophen 650mg q6h PRN  [] IV hydration as tolerated  [] B12, folate, A1C  [] PT/OT  [] Can consider vestibular rehab upon discharge  [] Fall precautions    Case to be seen and discussed with neurology attending Dr. Chappell. 48yo R-handed Lithuanian-speaking woman with PMH of asthma and episodic vertigo presents to the ED with room-spinning dizziness for the past 2 days, associated with a frontal headache, tinnitus, and intermittent blurry vision. Patient reportedly has had similar symptoms which self-resolve after 2 days for the past 2 years. Physical exam remarkable for cautious non-ataxic gait, Romberg negative, decreased strength in the triceps, and hyperreflexia in the UEs. Labs grossly WNL. CTH with no acute pathology and CTA head/neck with patent vasculature. NIHSS 0. Pre-MRS 0.    Impression: Acute on chronic vertigo with a headache likely due to peripheral etiology such as vestibular neuritis vs. migrainous vertigo. Lower suspicion for central etiology including demyelinating lesion or ischemic CVA of the posterior circulation.    Recommendations:  [] MR brain w/w/o contrast  [] Orthostatic vital signs  [] Will defer ASA due to lower suspicion for CVA  [] Symptomatic management with meclizine 12.5-25mg q6h PRN and acetaminophen 650mg q6h PRN  [] IV hydration as tolerated  [] B12, folate, A1C  [] PT/OT  [] Can consider vestibular rehab upon discharge  [] Fall precautions  [] Follow-up in Neurology clinic at [address] [place] [address] ([phone]) after discharge for further management and care.    Case to be seen and discussed with neurology attending Dr. Chappell. 46yo R-handed Malay-speaking woman with PMH of asthma and episodic vertigo presents to the ED with room-spinning dizziness for the past 2 days, associated with a frontal headache, tinnitus, and intermittent blurry vision. Patient reportedly has had similar symptoms which self-resolve after 2 days for the past 2 years. Physical exam remarkable for cautious non-ataxic gait, Romberg negative, decreased strength in the triceps, and hyperreflexia in the UEs. Labs grossly WNL. CTH with no acute pathology and CTA head/neck with patent vasculature. NIHSS 0. Pre-MRS 0.    Impression: Acute on chronic vertigo with a headache likely due to peripheral etiology such as vestibular neuritis vs. migrainous vertigo. Lower suspicion for central etiology including demyelinating lesion or ischemic CVA of the posterior circulation.    Recommendations:  [] MR brain w/w/o contrast w/thin cuts through IACs  [] Orthostatic vital signs  [] Will defer ASA due to lower suspicion for CVA  [] Symptomatic management with meclizine 12.5-25mg q6h PRN and acetaminophen 650mg q6h PRN  [] IV hydration as tolerated  [] B12, folate, A1C  [] PT/OT  [] Can consider vestibular rehab upon discharge  [] Fall precautions  [] Follow-up in Neurology clinic at [address] [place] [address] ([phone]) after discharge for further management and care.    Case to be seen and discussed with neurology attending Dr. Chappell. 46yo R-handed Persian-speaking woman with PMH of asthma and episodic vertigo presents to the ED with room-spinning dizziness for the past 2 days, associated with a frontal headache, tinnitus, and intermittent blurry vision. Patient reportedly has had similar symptoms which self-resolve after 2 days for the past 2 years. Physical exam remarkable for cautious non-ataxic gait, Romberg negative, decreased strength in the triceps, and hyperreflexia in the UEs. Labs grossly WNL. CTH with no acute pathology and CTA head/neck with patent vasculature. NIHSS 0. Pre-MRS 0.    Impression: Acute on chronic vertigo with a headache likely due to peripheral etiology such as vestibular neuritis vs. migrainous vertigo. Lower suspicion for central etiology including demyelinating lesion or ischemic CVA of the posterior circulation.    Recommendations:  [] MR brain w/w/o contrast w/thin cuts through IACs  [] Orthostatic vital signs  [] Will defer ASA due to lower suspicion for CVA  [] Symptomatic management with meclizine 12.5-25mg q6h PRN and acetaminophen 650mg q6h PRN  [] IV hydration as tolerated  [] B12, folate, A1C  [] PT/OT  [] Vestibular rehab upon discharge  [] Fall precautions  ENT as outpatient.   [] Follow-up in Neurology clinic at [address] [place] [address] ([phone]) after discharge for further management and care.    Case to be seen and discussed with neurology attending Dr. Chappell.

## 2022-09-30 NOTE — H&P ADULT - NSHPPHYSICALEXAM_GEN_ALL_CORE
PHYSICAL EXAM:  GENERAL: NAD, well-developed, well-nourished  HEAD:  Atraumatic, Normocephalic  EYES: EOMI, PERRLA, conjunctiva and sclera clear  NECK: Supple, No JVD  CHEST/LUNG: Clear to auscultation bilaterally; No wheezes, rales or rhonchi  HEART: Regular rate and rhythm; No murmurs, rubs, or gallops, (+)S1, S2  ABDOMEN: Soft, Nontender, Nondistended; Normal Bowel sounds   EXTREMITIES:  2+ Peripheral Pulses, No clubbing, cyanosis, or edema  PSYCH: normal mood and affect  NEUROLOGY: AAOx3, CN intact b/l, strength 5/5 b/l UE and LE  SKIN: No rashes or lesions

## 2022-09-30 NOTE — H&P ADULT - ASSESSMENT
48 y/o woman w/ asthma and recurrent episodes of vertigo presents to the ER with 2 days of dizziness (room spinning), tinnitus and frontal headache. Admitted for further evaluation

## 2022-09-30 NOTE — H&P ADULT - NSHPREVIEWOFSYSTEMS_GEN_ALL_CORE
REVIEW OF SYSTEMS:    CONSTITUTIONAL: +generalized weakness, no fevers or chills  EYES/ENT: No visual changes;  No dysphagia  NECK: No pain or stiffness  RESPIRATORY: No cough, wheezing, hemoptysis; No shortness of breath  CARDIOVASCULAR: No chest pain or palpitations; No lower extremity edema  GASTROINTESTINAL: No abdominal or epigastric pain. No nausea, vomiting, or hematemesis; No diarrhea or constipation. No melena or hematochezia.  GENITOURINARY: No dysuria, frequency or hematuria  NEUROLOGICAL: +dizziness, tinnitus and headache. No numbness or focal weakness  MSK: no back pain  SKIN: No itching, burning, rashes, or lesions

## 2022-09-30 NOTE — H&P ADULT - HISTORY OF PRESENT ILLNESS
46 y/o woman w/ asthma and recurrent episodes of vertigo presents to the ER with 2 days of dizziness (room spinning), tinnitus and frontal headache. She has been having these symptoms on and off for almost two years now and has had MRIs in the past before which reportedly did not show anything. They never had a formal diagnosis. Her symptoms will come on randomly every month or two and last around 2 days. She estimates about 20 episodes in the last 2 years. She endorses concurrent generalized weakness and no numbness. No fever, photophobia, focal weakness, sob, chest pain, abd pain, dysuria or diarrhea. In the ER pt was given fluids, meclizine and tylenol.

## 2022-09-30 NOTE — PHYSICAL THERAPY INITIAL EVALUATION ADULT - NSACTIVITYREC_GEN_A_PT
Attempted [place]-Hallpike maneuver with patient on stretcher but was unable to formally assess nystagmus secondary to pt not able to tolerate keeping eyes open, however pt noted to have symptoms associated with BPPV.

## 2022-09-30 NOTE — H&P ADULT - PROBLEM SELECTOR PLAN 1
Pt presenting with intermittent dizziness (room spinning), tinnitus and frontal headache (happening about 20 times in the last 2 years).  -differential includes peripheral etiology like BPPV vs meniere disease vs vestibular migraine vs central etiology such as cva. Pt reports she has had MRis done in the past which did not show much  -Currently dizziness is improving although still somehwat present  -continue meclizine and zofran prn  -neurology consulted, will f/u recs.   -obtain MR head  -physical therapy consult  -neuro checks

## 2022-09-30 NOTE — DISCHARGE NOTE PROVIDER - PROVIDER TOKENS
FREE:[LAST:[Your PCP],PHONE:[(   )    -],FAX:[(   )    -],FOLLOWUP:[1-3 days],ESTABLISHEDPATIENT:[T]]

## 2022-09-30 NOTE — PROGRESS NOTE ADULT - SUBJECTIVE AND OBJECTIVE BOX
46 yo female with pmhx of asthma and recurrent vertigo presents to ed with complaints of 2d of dizziness described as room spinning sensation, associated with headache. CT Head, CTA h/n with no acute findings. pending MRI brain w/wo contrast and neuro eval.     Subjective:  ASHLEY, vss  reports dizziness improved this morning. denies any headache. able to ambulate to bathroom. denies diplopia, no LE weakness. Np CP/sob. no fever/chills.  reports dizziness when occurs is vertiginous occurs about twice a month and has been an ongoing problem for the past few years. outpatient work up has been negative.     Orthostatics this morning was   lying: BP:94/81, HR: 61-80  standing; BP: 118/87, hr: 82    Did complained of dizziness upon standing.     MEDICATIONS  (STANDING):  loratadine 10 milliGRAM(s) Oral daily    MEDICATIONS  (PRN):  acetaminophen     Tablet .. 650 milliGRAM(s) Oral every 6 hours PRN Temp greater or equal to 38C (100.4F), Mild Pain (1 - 3)  ALBUTerol    90 MICROgram(s) HFA Inhaler 2 Puff(s) Inhalation every 6 hours PRN Shortness of Breath  meclizine 25 milliGRAM(s) Oral every 8 hours PRN Dizziness  ondansetron Injectable 4 milliGRAM(s) IV Push every 8 hours PRN Nausea and/or Vomiting    VITAL SIGNS:  T(C): 36.6 (22 @ 12:00), Max: 37.1 (22 @ 22:27)  T(F): 97.9 (22 @ 12:00), Max: 98.8 (22 @ 22:27)  HR: 67 (22 @ 12:00) (56 - 72)  BP: 108/78 (22 @ 12:00) (108/78 - 136/79)  BP(mean): --  RR: 16 (22 @ 12:00) (16 - 16)  SpO2: 100% (22 @ 12:00) (100% - 100%)  Wt(kg): --    PHYSICAL EXAM:  Constitutional: WDWN resting comfortably in bed; NAD  Head: NC/AT  Eyes: PERRL, EOMI, anicteric sclera  ENT: no nasal discharge; MMM  Neck: supple; no JVD  Respiratory: CTA B/L; no W/R/R  Cardiac: +S1/S2; RRR; no M/R/G  Gastrointestinal: soft, NT/ND; no rebound or guarding; +BSx4  Extremities: WWP, no clubbing or cyanosis; no peripheral edema  Musculoskeletal: NROM x4; no joint swelling, tenderness or erythema  Vascular: 2+ radial, DP/PT pulses B/L  Neurologic: AAOx3; CNII-XII grossly intact; no focal deficits, no nystagmus noted    LABS:                        12.1   3.20  )-----------( 176      ( 30 Sep 2022 05:58 )             35.8     -    139  |  105  |  8   ----------------------------<  87  4.0   |  24  |  0.55    Ca    8.5      30 Sep 2022 05:58  Phos  3.1     -  Mg     1.80         TPro  7.0  /  Alb  4.0  /  TBili  <0.2  /  DBili  x   /  AST  31  /  ALT  30  /  AlkPhos  74  09-29    PT/INR - ( 30 Sep 2022 05:58 )   PT: 16.8 sec;   INR: 1.44 ratio      Urinalysis Basic - ( 29 Sep 2022 19:00 )  Color: Light Yellow / Appearance: Clear / S.009 / pH: x  Gluc: x / Ketone: Negative  / Bili: Negative / Urobili: <2 mg/dL   Blood: x / Protein: Negative / Nitrite: Negative   Leuk Esterase: Negative / RBC: x / WBC x   Sq Epi: x / Non Sq Epi: x / Bacteria: x    CAPILLARY BLOOD GLUCOSE  POCT Blood Glucose.: 93 mg/dL (29 Sep 2022 16:25)  RADIOLOGY & ADDITIONAL TESTS: Reviewed.

## 2022-09-30 NOTE — H&P ADULT - NSHPLABSRESULTS_GEN_ALL_CORE
134<L>  |  102  |  10  ----------------------------<  88  4.1   |  24  |  0.69    Ca    8.7      29 Sep 2022 19:00    TPro  7.0  /  Alb  4.0  /  TBili  <0.2  /  DBili  x   /  AST  31  /  ALT  30  /  AlkPhos  74                              12.4   3.87  )-----------( 196      ( 29 Sep 2022 19:00 )             37.1             LIVER FUNCTIONS - ( 29 Sep 2022 19:00 )  Alb: 4.0 g/dL / Pro: 7.0 g/dL / ALK PHOS: 74 U/L / ALT: 30 U/L / AST: 31 U/L / GGT: x                 Urinalysis Basic - ( 29 Sep 2022 19:00 )    Color: Light Yellow / Appearance: Clear / S.009 / pH: x  Gluc: x / Ketone: Negative  / Bili: Negative / Urobili: <2 mg/dL   Blood: x / Protein: Negative / Nitrite: Negative   Leuk Esterase: Negative / RBC: x / WBC x   Sq Epi: x / Non Sq Epi: x / Bacteria: x      EKG: normal sinus rhythm    CTA H&N: CT ANGIOGRAPHY BRAIN:  Internal carotid arteries: Patent bilaterally.  No flow limiting stenosis.    Anterior cerebral arteries: Patent bilaterally without flow limiting   stenosis.    Middle cerebral arteries: Patent bilaterally without flow limiting    stenosis.    Anterior communicating artery: Visualized.    Posterior communicating arteries: Visualized bilaterally.    Posterior cerebral arteries: Patent bilaterally without stenosis.    Vertebrobasilar: Patent without stenosis.  The distal vertebral arteries   are similar in caliber.  Bilateral posterior inferior cerebellar   arteries, bilateral anterior inferior cerebellar arteries and bilateral   superior cerebellar arteries are visualized.    Vascular lesions: No evidence of intracranial aneurysm within limits of   CTA technique.  Tiny aneurysms may be beyond the resolution of this   examination.    Dural venous sinuses: Grossly patent.    Brain: No evidence of acute territorial infarct, mass or abnormal   enhancement.      IMPRESSION:  CT Head: No acute intracranial hemorrhage or mass effect.    CTA Neck: No hemodynamically stenosis by NASCET criteria, dissection, or   pseudoaneurysm.    CTA Head: No major vessel occlusion, significant stenosis, dissection, or   saccular aneurysm.

## 2022-09-30 NOTE — DISCHARGE NOTE PROVIDER - HOSPITAL COURSE
48 y/o woman w/ asthma and recurrent episodes of vertigo presents to the ER with 2 days of dizziness (room spinning), tinnitus and frontal headache.       Problem 1. Vertigo.   ·  Plan: Pt presenting with intermittent dizziness (room spinning), tinnitus and frontal headache (happening about 20 times in the last 2 years).  -differential includes peripheral etiology like BPPV vs meniere disease vs vestibular migraine vs central etiology such as cva. Pt reports she has had MRis done in the past which did not show much  -Currently dizziness is improving although still somewhat present  -continue meclizine and zofran prn  -neurology consulted,  recs as follows:   [] MR brain w/w/o contrast w/thin cuts through IACs  [] Orthostatic vital signs  [] Will defer ASA due to lower suspicion for CVA  [] Symptomatic management with meclizine 12.5-25mg q6h PRN and acetaminophen 650mg q6h PRN  [] IV hydration as tolerated  [] B12, folate, A1C  [] PT/OT  [] Can consider vestibular rehab upon discharge  [] Fall precautions  [] Follow-up in Neurology clinic at [address] [place] [address] ([phone]) after discharge for further management and care.        Called by nurse to evaluate patient who wishes to leave the hospital against medical advice. Patient is Persian speaking, her  Mr. Sheldon Harris was at bedside to provide translation per patient request. The patient is A&O x4 and has full capacity to make her own medical decisions. She was informed of her medical conditions, benefits, and alternatives to treatment as well as the risks of refusing treatment and the seriousness of leaving against medical advice such as the risks of heart attack, stroke, seizure, and even death were fully explained to the patient and her .  After expressing full understanding, the patient signed out against medical advice witnessed by the nurse.  The patient was informed to follow-up with PCP as soon as possible, and to arrange for outpatient MRI. Patient's  stated they will see PCP on Monday. Attending made aware.

## 2022-09-30 NOTE — DISCHARGE NOTE PROVIDER - NSFOLLOWUPCLINICS_GEN_ALL_ED_FT
[facility]  Neurology  [address] [place] [address] [address] [place] [address]  Phone: [phone]  Fax:   Follow Up Time: 2 weeks

## 2022-09-30 NOTE — CONSULT NOTE ADULT - SUBJECTIVE AND OBJECTIVE BOX
Neurology  Consult Note  09-30-22    Name:  LEWIS KEE; 47y (1975)    Reason for Admission: Dizziness and giddiness    HPI: 46yo R-handed German-speaking woman with PMH of asthma and episodic vertigo presents to the ED with dizziness for the past 2 days. Patient's  was at bedside and provided translation at patient's request. Patient reportedly has had similar symptoms which self-resolve after 2 days for the past 2 years. They are not associated with any particular activity and can occur at any time of the day. For this event patient was getting her kids ready for school when the dizziness, described as room spinning with gait instability, began suddenly along with a frontal headache. She also reports intermittent blurry vision, b/l tinnitus without hearing loss, occasional nausea, and generalized weakness. She denies numbness, photophobia, difficulty with speech or swallow, recent fever or illness, or recent changes in medications. NIHSS 0. Pre-MRS 0.    Review of Systems:  As states in HPI.    Allergies:  Hair Dye (Angioedema)      PMHx:   Asthma    Vertigo      PFHx:     FHx: coronary artery disease      PSuHx:   S/P cholecystectomy        Medications:  MEDICATIONS  (STANDING):  loratadine 10 milliGRAM(s) Oral daily    MEDICATIONS  (PRN):  acetaminophen     Tablet .. 650 milliGRAM(s) Oral every 6 hours PRN Temp greater or equal to 38C (100.4F), Mild Pain (1 - 3)  ALBUTerol    90 MICROgram(s) HFA Inhaler 2 Puff(s) Inhalation every 6 hours PRN Shortness of Breath  meclizine 25 milliGRAM(s) Oral every 8 hours PRN Dizziness  ondansetron Injectable 4 milliGRAM(s) IV Push every 8 hours PRN Nausea and/or Vomiting      Vitals:  T(C): 36.7 (09-30-22 @ 02:50), Max: 37.1 (09-29-22 @ 22:27)  HR: 60 (09-30-22 @ 02:50) (60 - 72)  BP: 125/76 (09-30-22 @ 02:50) (121/79 - 136/79)  RR: 16 (09-30-22 @ 02:50) (16 - 16)  SpO2: 100% (09-30-22 @ 02:50) (100% - 100%)    Physical Examination:  Neurologic:  - Mental Status: Alert, awake, oriented to person, place, and time; Speech is fluent with intact naming, repetition, and comprehension; Grossly follows all commands.  - Cranial Nerves:  II: Visual fields are full to confrontation; Pupils are equal, round, and reactive to light;  III, IV, VI: Extraocular movements are intact without nystagmus.  V: Facial sensation is intact in the V1-V3 distribution bilaterally.  VII: Face is symmetric with normal eye closure and smile.  VIII: Hearing is intact to finger rub.  IX, X: Uvula is midline and soft palate rises symmetrically.  XI: Head turning and shoulder shrug are intact.  XII: Tongue protrudes in the midline.  - Motor: Strength is 4-/5 in the triceps, otherwise 5/5 throughout. There is no pronator drift. Normal muscle bulk and tone. No tremor or myoclonus noted.  - Reflexes: 3+ and symmetric at the biceps, brachioradialis, 1+ knees and ankles. Plantar responses mute on the R and up on the L.  - Sensory: Intact throughout to light touch. No extinction on double stimulation.  - Coordination: Finger-nose-finger intact without dysmetria.  - Gait: Small cautious steps, normal base, reduced arm swing, and slow turning, often holding onto surfaces for stability. Romberg testing is negative.    Labs:                        12.4   3.87  )-----------( 196      ( 29 Sep 2022 19:00 )             37.1     09-29    134<L>  |  102  |  10  ----------------------------<  88  4.1   |  24  |  0.69    Ca    8.7      29 Sep 2022 19:00    TPro  7.0  /  Alb  4.0  /  TBili  <0.2  /  DBili  x   /  AST  31  /  ALT  30  /  AlkPhos  74  09-29    CAPILLARY BLOOD GLUCOSE  POCT Blood Glucose.: 93 mg/dL (29 Sep 2022 16:25)    LIVER FUNCTIONS - ( 29 Sep 2022 19:00 )  Alb: 4.0 g/dL / Pro: 7.0 g/dL / ALK PHOS: 74 U/L / ALT: 30 U/L / AST: 31 U/L / GGT: x             Radiology:  CT Angio Head w/ IV Cont (09.29.22 @ 22:48)  IMPRESSION:  CT Head: No acute intracranial hemorrhage or mass effect.    CTA Neck: No hemodynamically stenosis by NASCET criteria, dissection, or   pseudoaneurysm.    CTA Head: No major vessel occlusion, significant stenosis, dissection, or   saccular aneurysm.

## 2022-09-30 NOTE — DISCHARGE NOTE NURSING/CASE MANAGEMENT/SOCIAL WORK - NSDCPEFALRISK_GEN_ALL_CORE
For information on Fall & Injury Prevention, visit: [email or web address] OR  [email or web address] OR  https://www.cdc.gov/steadi/patient.html

## 2022-09-30 NOTE — DISCHARGE NOTE PROVIDER - NSDCHC_MEDRECSTATUS_GEN_ALL_CORE
Pt visible on unit  Slight irritability noted with peers during group  Easily redirectable by staff  Compliant with medications taking only lithium and ativan  refusing all other medications  Pt observed on telephone with pressured speech  Rambling  Fixating on no trespassing signs and smoking areas one he  Is discharged  Easily agitated but quickly ended phone call before it escalated  Pt immediately spoke to RN afterwards with bright affect and laughing  Retreated to dining room with peers  Admission Reconciliation is Not Complete  Discharge Reconciliation is Completed

## 2022-09-30 NOTE — PHYSICAL THERAPY INITIAL EVALUATION ADULT - GENERAL OBSERVATIONS, REHAB EVAL
Pt received semi-supine on stretcher, +telemetry, in NAD.  at bedside, pt agreeable to participate in PT evaluation.

## 2022-09-30 NOTE — DISCHARGE NOTE PROVIDER - NSDCCPCAREPLAN_GEN_ALL_CORE_FT
PRINCIPAL DISCHARGE DIAGNOSIS  Diagnosis: Dizziness  Assessment and Plan of Treatment: -Currently dizziness is improving although still somewhat present-neurology consulted, reccomended MR brain w/w/o contrast w/thin cuts through IACs--- NOT DONE. You decided to leave hospital against medical advice prior to this test, you are advised to make appointment as outpatient.   - Follow-up in Neurology clinic at [address] [place] [address] ([phone]) after discharge for further management and care.

## 2022-09-30 NOTE — H&P ADULT - PROBLEM SELECTOR PLAN 2
no wheezing currently. start albuterol prn. Unclear what her maintenance inhlaer is, will need to confirm in AM

## 2022-09-30 NOTE — DISCHARGE NOTE NURSING/CASE MANAGEMENT/SOCIAL WORK - PATIENT PORTAL LINK FT
You can access the FollowMyHealth Patient Portal offered by [facility] by registering at the following website: [email or web address]. By joining [employer]’s FollowMyHealth portal, you will also be able to view your health information using other applications (apps) compatible with our system.

## 2022-10-03 PROBLEM — R42 DIZZINESS AND GIDDINESS: Chronic | Status: ACTIVE | Noted: 2022-09-30

## 2022-10-03 PROBLEM — J45.909 UNSPECIFIED ASTHMA, UNCOMPLICATED: Chronic | Status: ACTIVE | Noted: 2022-09-29

## 2022-10-04 ENCOUNTER — APPOINTMENT (OUTPATIENT)
Dept: NEUROLOGY | Facility: CLINIC | Age: 47
End: 2022-10-04

## 2023-03-31 NOTE — ED ADULT NURSE NOTE - INTERPRETATION SERVICES DECLINED
Family/Patient/Caregiver requests family/friend to interpret. Principal Discharge DX:	Testicular torsion   1

## 2023-09-22 NOTE — PHYSICAL THERAPY INITIAL EVALUATION ADULT - IMPAIRMENTS CONTRIBUTING TO GAIT DEVIATIONS, PT EVAL
Received patient from procedural area. Right groin site clean and dry. Patient very restless, trying to get out of bed. Purewick applied to suction  Knee immobilizer applied right leg. Pt continues to be restless, trying to climb out of bed. Ativan given per order. Continues to be restless. Unable to void, refuses catheter. Up to bathroom to void at 2 hours post procedure. Awaiting for Dr. Silva Orta to speak with patient and daughter before discharge. Ambulated in room with walker assistance at times. Dr. Silva Orta at bedside to speak with patient and daughter. Rx sent to pharmacy for Bumex. Discharge and follow-up instructions given. Discharged home per W/C,VSS. Right groin site remains clean and dry, no bleeding or swelling noted.
decreased strength

## 2024-01-31 ENCOUNTER — EMERGENCY (EMERGENCY)
Facility: HOSPITAL | Age: 49
LOS: 1 days | Discharge: HOME | End: 2024-01-31
Attending: EMERGENCY MEDICINE | Admitting: EMERGENCY MEDICINE
Payer: MEDICAID

## 2024-01-31 VITALS
RESPIRATION RATE: 22 BRPM | OXYGEN SATURATION: 99 % | DIASTOLIC BLOOD PRESSURE: 78 MMHG | HEART RATE: 86 BPM | SYSTOLIC BLOOD PRESSURE: 115 MMHG | TEMPERATURE: 98 F

## 2024-01-31 DIAGNOSIS — Z90.49 ACQUIRED ABSENCE OF OTHER SPECIFIED PARTS OF DIGESTIVE TRACT: Chronic | ICD-10-CM

## 2024-01-31 LAB
ALBUMIN SERPL ELPH-MCNC: 3.7 G/DL — SIGNIFICANT CHANGE UP (ref 3.3–5)
ALP SERPL-CCNC: 83 U/L — SIGNIFICANT CHANGE UP (ref 40–120)
ALT FLD-CCNC: 23 U/L — SIGNIFICANT CHANGE UP (ref 4–33)
ANION GAP SERPL CALC-SCNC: 8 MMOL/L — SIGNIFICANT CHANGE UP (ref 7–14)
ANISOCYTOSIS BLD QL: SLIGHT — SIGNIFICANT CHANGE UP
APTT BLD: 30.2 SEC — SIGNIFICANT CHANGE UP (ref 24.5–35.6)
AST SERPL-CCNC: 23 U/L — SIGNIFICANT CHANGE UP (ref 4–32)
BASOPHILS # BLD AUTO: 0.03 K/UL — SIGNIFICANT CHANGE UP (ref 0–0.2)
BASOPHILS NFR BLD AUTO: 0.9 % — SIGNIFICANT CHANGE UP (ref 0–2)
BILIRUB SERPL-MCNC: <0.2 MG/DL — SIGNIFICANT CHANGE UP (ref 0.2–1.2)
BLD GP AB SCN SERPL QL: NEGATIVE — SIGNIFICANT CHANGE UP
BUN SERPL-MCNC: 7 MG/DL — SIGNIFICANT CHANGE UP (ref 7–23)
CALCIUM SERPL-MCNC: 8.6 MG/DL — SIGNIFICANT CHANGE UP (ref 8.4–10.5)
CHLORIDE SERPL-SCNC: 104 MMOL/L — SIGNIFICANT CHANGE UP (ref 98–107)
CO2 SERPL-SCNC: 26 MMOL/L — SIGNIFICANT CHANGE UP (ref 22–31)
CREAT SERPL-MCNC: 0.52 MG/DL — SIGNIFICANT CHANGE UP (ref 0.5–1.3)
EGFR: 115 ML/MIN/1.73M2 — SIGNIFICANT CHANGE UP
EOSINOPHIL # BLD AUTO: 0 K/UL — SIGNIFICANT CHANGE UP (ref 0–0.5)
EOSINOPHIL NFR BLD AUTO: 0 % — SIGNIFICANT CHANGE UP (ref 0–6)
GIANT PLATELETS BLD QL SMEAR: PRESENT — SIGNIFICANT CHANGE UP
GLUCOSE SERPL-MCNC: 109 MG/DL — HIGH (ref 70–99)
HCG SERPL-ACNC: <1 MIU/ML — SIGNIFICANT CHANGE UP
HCT VFR BLD CALC: 32.4 % — LOW (ref 34.5–45)
HGB BLD-MCNC: 10.9 G/DL — LOW (ref 11.5–15.5)
IANC: 2.6 K/UL — SIGNIFICANT CHANGE UP (ref 1.8–7.4)
INR BLD: 1.08 RATIO — SIGNIFICANT CHANGE UP (ref 0.85–1.18)
LIDOCAIN IGE QN: 26 U/L — SIGNIFICANT CHANGE UP (ref 7–60)
LYMPHOCYTES # BLD AUTO: 0.31 K/UL — LOW (ref 1–3.3)
LYMPHOCYTES # BLD AUTO: 9.6 % — LOW (ref 13–44)
MACROCYTES BLD QL: SLIGHT — SIGNIFICANT CHANGE UP
MANUAL SMEAR VERIFICATION: SIGNIFICANT CHANGE UP
MCHC RBC-ENTMCNC: 28.8 PG — SIGNIFICANT CHANGE UP (ref 27–34)
MCHC RBC-ENTMCNC: 33.6 GM/DL — SIGNIFICANT CHANGE UP (ref 32–36)
MCV RBC AUTO: 85.5 FL — SIGNIFICANT CHANGE UP (ref 80–100)
MICROCYTES BLD QL: SLIGHT — SIGNIFICANT CHANGE UP
MONOCYTES # BLD AUTO: 0.23 K/UL — SIGNIFICANT CHANGE UP (ref 0–0.9)
MONOCYTES NFR BLD AUTO: 7 % — SIGNIFICANT CHANGE UP (ref 2–14)
NEUTROPHILS # BLD AUTO: 2.53 K/UL — SIGNIFICANT CHANGE UP (ref 1.8–7.4)
NEUTROPHILS NFR BLD AUTO: 78.1 % — HIGH (ref 43–77)
NRBC # BLD: 1 /100 WBCS — HIGH (ref 0–0)
OVALOCYTES BLD QL SMEAR: SLIGHT — SIGNIFICANT CHANGE UP
PLAT MORPH BLD: NORMAL — SIGNIFICANT CHANGE UP
PLATELET # BLD AUTO: 143 K/UL — LOW (ref 150–400)
PLATELET COUNT - ESTIMATE: ABNORMAL
POIKILOCYTOSIS BLD QL AUTO: SLIGHT — SIGNIFICANT CHANGE UP
POTASSIUM SERPL-MCNC: 3.4 MMOL/L — LOW (ref 3.5–5.3)
POTASSIUM SERPL-SCNC: 3.4 MMOL/L — LOW (ref 3.5–5.3)
PROT SERPL-MCNC: 6.8 G/DL — SIGNIFICANT CHANGE UP (ref 6–8.3)
PROTHROM AB SERPL-ACNC: 12.1 SEC — SIGNIFICANT CHANGE UP (ref 9.5–13)
RBC # BLD: 3.79 M/UL — LOW (ref 3.8–5.2)
RBC # FLD: 12.8 % — SIGNIFICANT CHANGE UP (ref 10.3–14.5)
RBC BLD AUTO: ABNORMAL
RH IG SCN BLD-IMP: POSITIVE — SIGNIFICANT CHANGE UP
SODIUM SERPL-SCNC: 138 MMOL/L — SIGNIFICANT CHANGE UP (ref 135–145)
TROPONIN T, HIGH SENSITIVITY RESULT: <6 NG/L — SIGNIFICANT CHANGE UP
VARIANT LYMPHS # BLD: 4.4 % — SIGNIFICANT CHANGE UP (ref 0–6)
WBC # BLD: 3.24 K/UL — LOW (ref 3.8–10.5)
WBC # FLD AUTO: 3.24 K/UL — LOW (ref 3.8–10.5)

## 2024-01-31 PROCEDURE — 99284 EMERGENCY DEPT VISIT MOD MDM: CPT

## 2024-01-31 PROCEDURE — 93010 ELECTROCARDIOGRAM REPORT: CPT

## 2024-01-31 PROCEDURE — 71046 X-RAY EXAM CHEST 2 VIEWS: CPT | Mod: 26

## 2024-01-31 RX ORDER — FAMOTIDINE 10 MG/ML
20 INJECTION INTRAVENOUS ONCE
Refills: 0 | Status: COMPLETED | OUTPATIENT
Start: 2024-01-31 | End: 2024-01-31

## 2024-01-31 RX ORDER — OMEPRAZOLE 10 MG/1
1 CAPSULE, DELAYED RELEASE ORAL
Qty: 28 | Refills: 0
Start: 2024-01-31 | End: 2024-02-13

## 2024-01-31 RX ORDER — SODIUM CHLORIDE 9 MG/ML
1000 INJECTION INTRAMUSCULAR; INTRAVENOUS; SUBCUTANEOUS ONCE
Refills: 0 | Status: COMPLETED | OUTPATIENT
Start: 2024-01-31 | End: 2024-01-31

## 2024-01-31 RX ORDER — METOCLOPRAMIDE HCL 10 MG
1 TABLET ORAL
Qty: 28 | Refills: 0
Start: 2024-01-31 | End: 2024-02-06

## 2024-01-31 RX ORDER — METOCLOPRAMIDE HCL 10 MG
10 TABLET ORAL ONCE
Refills: 0 | Status: COMPLETED | OUTPATIENT
Start: 2024-01-31 | End: 2024-01-31

## 2024-01-31 RX ORDER — SUCRALFATE 1 G
1 TABLET ORAL
Qty: 28 | Refills: 0
Start: 2024-01-31 | End: 2024-02-06

## 2024-01-31 RX ORDER — LIDOCAINE 4 G/100G
10 CREAM TOPICAL ONCE
Refills: 0 | Status: COMPLETED | OUTPATIENT
Start: 2024-01-31 | End: 2024-01-31

## 2024-01-31 RX ADMIN — Medication 30 MILLILITER(S): at 12:45

## 2024-01-31 RX ADMIN — SODIUM CHLORIDE 1000 MILLILITER(S): 9 INJECTION INTRAMUSCULAR; INTRAVENOUS; SUBCUTANEOUS at 12:45

## 2024-01-31 RX ADMIN — FAMOTIDINE 20 MILLIGRAM(S): 10 INJECTION INTRAVENOUS at 12:45

## 2024-01-31 RX ADMIN — Medication 10 MILLIGRAM(S): at 12:45

## 2024-01-31 RX ADMIN — LIDOCAINE 10 MILLILITER(S): 4 CREAM TOPICAL at 12:46

## 2024-01-31 NOTE — ED ADULT NURSE NOTE - OBJECTIVE STATEMENT
Patient received intake, a&ox4, ambulatory. pts son Placido states " my mom has trouble breathing with chest pain in the middle of the chest  going in to her back since last night" h/o asthma , H Pylori, endoscopy 1 month ago. pt denies sob, n+v, headache, dizziness, fever, chills. Breathing even, unlabored. 20g IV placed to left ac, labs collected and sent. Pt medicated as per MAR. Safety maintained.

## 2024-01-31 NOTE — ED PROVIDER NOTE - NSFOLLOWUPINSTRUCTIONS_ED_ALL_ED_FT
Esophagits/Gastritis  WHAT YOU NEED TO KNOW:  Esophagitis gastritis   Esophagitis/Gastritis is inflammation or irritation of the tube leading to your stomach or the lining of the stomach itself.   Abdominal Organs   DISCHARGE INSTRUCTIONS:  Call your doctor if:   •You have new or worsening symptoms, even after treatment.  •You have questions or concerns about your condition or care.  Medicines:   •Medicines may be given to fight an infection or to control stomach acid.  Call 911 for any of the following:   •You develop chest pain or shortness of breath.  Seek care immediately if:   •You vomit blood.  •You have black or bloody bowel movements.  •You have severe stomach or back pain.  Contact your healthcare provider if:   •You have a fever.  •You have new or worsening symptoms, even after treatment.  •You have questions or concerns about your condition or care.  Medicines:   •Medicines may be given to help treat a bacterial infection or decrease stomach acid.   •Take your medicine as directed. Contact your healthcare provider if you think your medicine is not helping or if you have side effects. Tell him or her if you are allergic to any medicine. Keep a list of the medicines, vitamins, and herbs you take. Include the amounts, and when and why you take them. Bring the list or the pill bottles to follow-up visits. Carry your medicine list with you in case of an emergency.  Manage or prevent esophagitis/gastritis:   •Do not smoke. Nicotine and other chemicals in cigarettes and cigars can make your symptoms worse and cause lung damage. Ask your healthcare provider for information if you currently smoke and need help to quit. E-cigarettes or smokeless tobacco still contain nicotine. Talk to your healthcare provider before you use these products.   •Do not drink alcohol. Alcohol can prevent healing and make your gastritis worse. Talk to your healthcare provider if you need help to stop drinking.  •Do not take NSAIDs or aspirin unless directed. These and similar medicines can cause irritation. If your healthcare provider says it is okay to take NSAIDs, take them with food.   •Do not eat foods that cause irritation. Foods such as oranges and salsa can cause burning or pain. Eat a variety of healthy foods. Examples include fruits (not citrus), vegetables, low-fat dairy products, beans, whole-grain breads, and lean meats and fish. Try to eat small meals, and drink water with your meals. Do not eat for at least 3 hours before you go to bed.  •Limit or do not eat foods that can lead to esophagitis. Foods such as oranges and salsa can irritate your esophagus. Caffeine and chocolate can cause acid reflux. High-fat and fried foods make your stomach digest food more slowly. This increases the amount of stomach acid your esophagus is exposed to. Eat small meals, and drink water with your meals. Soft foods such as yogurt and applesauce may help soothe your throat. Do not eat for at least 3 hours before you go to bed.  •Drink more liquid when you take pills. Drink a full glass of water when you take your pills. Ask your healthcare provider if you can take your pills at least an hour before you go to bed.  •Prevent acid reflux. Do not bend over unless it is necessary. Acid may back up into your esophagus when you bend over. If possible, keep the head of your bed elevated while you sleep. This will help keep acid from backing up. Manage stress. Stress can make your symptoms worse or cause stomach acid to back up.  •Keep batteries and similar objects out of the reach of children. Babies often put items in their mouths to explore them. Button batteries are easy to swallow and can cause serious damage. Keep the battery covers of electronic devices such as remote controls taped closed. Store all batteries and toxic materials where children cannot get to them. Use childproof locks to keep children away from dangerous materials.  •Find ways to relax and decrease stress. Stress can increase stomach acid and make gastritis worse. Activities such as yoga, meditation, or listening to music can help you relax. Spend time with friends, or do things you enjoy.  Follow up with your healthcare provider as directed: You may need ongoing tests or treatment, or referral to a gastroenterologist. Write down your questions so you remember to ask them during your visits.  Call your local emergency number (911 in the [place]) for any of the following:   •You have any of the following signs of a heart attack: ?Squeezing, pressure, or pain in your chest  ?You may also have any of the following: ?Discomfort or pain in your back, neck, jaw, stomach, or arm  ?Shortness of breath  ?Nausea or vomiting  ?Lightheadedness or a sudden cold sweat  Seek care immediately if:   •You feel like you have food stuck in your throat and you cannot cough it out.

## 2024-01-31 NOTE — ED PROVIDER NOTE - PATIENT PORTAL LINK FT
You can access the FollowMyHealth Patient Portal offered by [facility] by registering at the following website: [email or web address]. By joining [employer]’s FollowMyHealth portal, you will also be able to view your health information using other applications (apps) compatible with our system.

## 2024-01-31 NOTE — ED ADULT TRIAGE NOTE - CHIEF COMPLAINT QUOTE
son  Placido states " my mom has trouble breathing with chest pain in the middle of the chest  going in to her back since last night" h/o asthma , H Pylori, endoscopy 1 month ago.

## 2024-01-31 NOTE — ED PROVIDER NOTE - CLINICAL SUMMARY MEDICAL DECISION MAKING FREE TEXT BOX
The patient is a 49y Female who has a past medical and surgery history of Vertigo Asthma cholecystectomy PTED with son  Placido states " my mom has trouble breathing with chest pain in the middle of the chest  going in to her back since last night" h/o asthma , H Pylori, endoscopy 1 month ago.   Vital Signs Last 24 Hrs  T(F): 98.2 HR: 86 BP: 115/78 RR: 22 SpO2: 99% (31 Jan 2024 10:47) (99% - 99%)  PE: as described; my additions and exceptions are noted in the chart    DATA:  EKG: Diagnosis Line Normal sinus rhythm 66  Normal ECG    LAB: Pending at time of evaluation     IMPRESSION/RISK:  Dx=  gastritis   Consideration include Will treat aggressively for gastritis while screening for hepatobiliary dz and pancreatitis; doubt ACS   Plan  if + response will d/c with reglan protonix carafate and gi followup  RTED PRN  if w/u positive for any of above conditions will adjust w/u

## 2024-01-31 NOTE — ED PROVIDER NOTE - OBJECTIVE STATEMENT
The patient is a 49y Female who has a past medical and surgery history of Vertigo Asthma cholecystectomy PTED with son  Placido states " my mom has trouble breathing with chest pain in the middle of the chest  going in to her back since last night" h/o asthma , H Pylori, endoscopy 1 month ago.

## 2024-01-31 NOTE — ED ADULT NURSE NOTE - NSFALLUNIVINTERV_ED_ALL_ED
Bed/Stretcher in lowest position, wheels locked, appropriate side rails in place/Call bell, personal items and telephone in reach/Instruct patient to call for assistance before getting out of bed/chair/stretcher/Non-slip footwear applied when patient is off stretcher/[place] to call system/Physically safe environment - no spills, clutter or unnecessary equipment/Purposeful proactive rounding/Room/bathroom lighting operational, light cord in reach

## 2024-12-12 NOTE — PHYSICAL THERAPY INITIAL EVALUATION ADULT - SITTING BALANCE: STATIC
[de-identified] : L ear pain [FreeTextEntry6] : low grade fever 100.2 and ear pain denies cough/congestion was really bad last night mom said similar symptoms last summer, went to  and was given PO abx fair balance

## 2024-12-16 ENCOUNTER — EMERGENCY (EMERGENCY)
Facility: HOSPITAL | Age: 49
LOS: 1 days | Discharge: HOME | End: 2024-12-16
Attending: STUDENT IN AN ORGANIZED HEALTH CARE EDUCATION/TRAINING PROGRAM | Admitting: STUDENT IN AN ORGANIZED HEALTH CARE EDUCATION/TRAINING PROGRAM
Payer: MEDICAID

## 2024-12-16 VITALS
HEART RATE: 81 BPM | RESPIRATION RATE: 18 BRPM | DIASTOLIC BLOOD PRESSURE: 75 MMHG | WEIGHT: 164.91 LBS | OXYGEN SATURATION: 97 % | SYSTOLIC BLOOD PRESSURE: 113 MMHG | TEMPERATURE: 98 F

## 2024-12-16 DIAGNOSIS — Z90.49 ACQUIRED ABSENCE OF OTHER SPECIFIED PARTS OF DIGESTIVE TRACT: Chronic | ICD-10-CM

## 2024-12-16 LAB
ALBUMIN SERPL ELPH-MCNC: 4.1 G/DL — SIGNIFICANT CHANGE UP (ref 3.3–5)
ALP SERPL-CCNC: 93 U/L — SIGNIFICANT CHANGE UP (ref 40–120)
ALT FLD-CCNC: 35 U/L — HIGH (ref 4–33)
ANION GAP SERPL CALC-SCNC: 9 MMOL/L — SIGNIFICANT CHANGE UP (ref 7–14)
ANISOCYTOSIS BLD QL: SLIGHT — SIGNIFICANT CHANGE UP
AST SERPL-CCNC: 32 U/L — SIGNIFICANT CHANGE UP (ref 4–32)
BASE EXCESS BLDV CALC-SCNC: -0.6 MMOL/L — SIGNIFICANT CHANGE UP (ref -2–3)
BASOPHILS # BLD AUTO: 0.03 K/UL — SIGNIFICANT CHANGE UP (ref 0–0.2)
BASOPHILS NFR BLD AUTO: 0.9 % — SIGNIFICANT CHANGE UP (ref 0–2)
BILIRUB SERPL-MCNC: 0.2 MG/DL — SIGNIFICANT CHANGE UP (ref 0.2–1.2)
BUN SERPL-MCNC: 10 MG/DL — SIGNIFICANT CHANGE UP (ref 7–23)
CALCIUM SERPL-MCNC: 8.6 MG/DL — SIGNIFICANT CHANGE UP (ref 8.4–10.5)
CHLORIDE SERPL-SCNC: 105 MMOL/L — SIGNIFICANT CHANGE UP (ref 98–107)
CO2 BLDV-SCNC: 27 MMOL/L — HIGH (ref 22–26)
CO2 SERPL-SCNC: 23 MMOL/L — SIGNIFICANT CHANGE UP (ref 22–31)
CREAT SERPL-MCNC: 0.59 MG/DL — SIGNIFICANT CHANGE UP (ref 0.5–1.3)
EGFR: 112 ML/MIN/1.73M2 — SIGNIFICANT CHANGE UP
EOSINOPHIL # BLD AUTO: 0.06 K/UL — SIGNIFICANT CHANGE UP (ref 0–0.5)
EOSINOPHIL NFR BLD AUTO: 1.7 % — SIGNIFICANT CHANGE UP (ref 0–6)
FLUAV AG NPH QL: SIGNIFICANT CHANGE UP
FLUBV AG NPH QL: SIGNIFICANT CHANGE UP
GIANT PLATELETS BLD QL SMEAR: PRESENT — SIGNIFICANT CHANGE UP
GLUCOSE SERPL-MCNC: 96 MG/DL — SIGNIFICANT CHANGE UP (ref 70–99)
HCO3 BLDV-SCNC: 25 MMOL/L — SIGNIFICANT CHANGE UP (ref 22–29)
HCT VFR BLD CALC: 31.5 % — LOW (ref 34.5–45)
HGB BLD-MCNC: 11 G/DL — LOW (ref 11.5–15.5)
IANC: 1.72 K/UL — LOW (ref 1.8–7.4)
LYMPHOCYTES # BLD AUTO: 0.89 K/UL — LOW (ref 1–3.3)
LYMPHOCYTES # BLD AUTO: 27 % — SIGNIFICANT CHANGE UP (ref 13–44)
MACROCYTES BLD QL: SLIGHT — SIGNIFICANT CHANGE UP
MCHC RBC-ENTMCNC: 31.7 PG — SIGNIFICANT CHANGE UP (ref 27–34)
MCHC RBC-ENTMCNC: 34.9 G/DL — SIGNIFICANT CHANGE UP (ref 32–36)
MCV RBC AUTO: 90.8 FL — SIGNIFICANT CHANGE UP (ref 80–100)
MONOCYTES # BLD AUTO: 0.14 K/UL — SIGNIFICANT CHANGE UP (ref 0–0.9)
MONOCYTES NFR BLD AUTO: 4.3 % — SIGNIFICANT CHANGE UP (ref 2–14)
NEUTROPHILS # BLD AUTO: 2 K/UL — SIGNIFICANT CHANGE UP (ref 1.8–7.4)
NEUTROPHILS NFR BLD AUTO: 60.9 % — SIGNIFICANT CHANGE UP (ref 43–77)
NT-PROBNP SERPL-SCNC: <36 PG/ML — SIGNIFICANT CHANGE UP
PCO2 BLDV: 46 MMHG — SIGNIFICANT CHANGE UP (ref 39–52)
PH BLDV: 7.35 — SIGNIFICANT CHANGE UP (ref 7.32–7.43)
PLAT MORPH BLD: ABNORMAL
PLATELET # BLD AUTO: 210 K/UL — SIGNIFICANT CHANGE UP (ref 150–400)
PLATELET COUNT - ESTIMATE: NORMAL — SIGNIFICANT CHANGE UP
PO2 BLDV: 38 MMHG — SIGNIFICANT CHANGE UP (ref 25–45)
POLYCHROMASIA BLD QL SMEAR: SLIGHT — SIGNIFICANT CHANGE UP
POTASSIUM SERPL-MCNC: 4 MMOL/L — SIGNIFICANT CHANGE UP (ref 3.5–5.3)
POTASSIUM SERPL-SCNC: 4 MMOL/L — SIGNIFICANT CHANGE UP (ref 3.5–5.3)
PROT SERPL-MCNC: 7.2 G/DL — SIGNIFICANT CHANGE UP (ref 6–8.3)
RBC # BLD: 3.47 M/UL — LOW (ref 3.8–5.2)
RBC # FLD: 13.7 % — SIGNIFICANT CHANGE UP (ref 10.3–14.5)
RBC BLD AUTO: ABNORMAL
RSV RNA NPH QL NAA+NON-PROBE: SIGNIFICANT CHANGE UP
SAO2 % BLDV: 60.7 % — LOW (ref 67–88)
SARS-COV-2 RNA SPEC QL NAA+PROBE: SIGNIFICANT CHANGE UP
SMUDGE CELLS # BLD: PRESENT — SIGNIFICANT CHANGE UP
SODIUM SERPL-SCNC: 137 MMOL/L — SIGNIFICANT CHANGE UP (ref 135–145)
TROPONIN T, HIGH SENSITIVITY RESULT: <6 NG/L — SIGNIFICANT CHANGE UP
VARIANT LYMPHS # BLD: 5.2 % — SIGNIFICANT CHANGE UP (ref 0–6)
WBC # BLD: 3.28 K/UL — LOW (ref 3.8–10.5)
WBC # FLD AUTO: 3.28 K/UL — LOW (ref 3.8–10.5)

## 2024-12-16 PROCEDURE — 93010 ELECTROCARDIOGRAM REPORT: CPT

## 2024-12-16 PROCEDURE — 71045 X-RAY EXAM CHEST 1 VIEW: CPT | Mod: 26

## 2024-12-16 PROCEDURE — 99284 EMERGENCY DEPT VISIT MOD MDM: CPT

## 2024-12-16 RX ORDER — IPRATROPIUM BROMIDE AND ALBUTEROL SULFATE 2.5; .5 MG/3ML; MG/3ML
3 SOLUTION RESPIRATORY (INHALATION)
Refills: 0 | Status: COMPLETED | OUTPATIENT
Start: 2024-12-16 | End: 2024-12-16

## 2024-12-16 RX ORDER — METHYLPREDNISOLONE SOD SUCC 125 MG
125 VIAL (EA) INJECTION ONCE
Refills: 0 | Status: COMPLETED | OUTPATIENT
Start: 2024-12-16 | End: 2024-12-16

## 2024-12-16 RX ORDER — PREDNISONE 20 MG/1
2 TABLET ORAL
Qty: 6 | Refills: 0
Start: 2024-12-16 | End: 2024-12-18

## 2024-12-16 RX ADMIN — IPRATROPIUM BROMIDE AND ALBUTEROL SULFATE 3 MILLILITER(S): 2.5; .5 SOLUTION RESPIRATORY (INHALATION) at 10:42

## 2024-12-16 RX ADMIN — IPRATROPIUM BROMIDE AND ALBUTEROL SULFATE 3 MILLILITER(S): 2.5; .5 SOLUTION RESPIRATORY (INHALATION) at 10:18

## 2024-12-16 RX ADMIN — IPRATROPIUM BROMIDE AND ALBUTEROL SULFATE 3 MILLILITER(S): 2.5; .5 SOLUTION RESPIRATORY (INHALATION) at 10:05

## 2024-12-16 RX ADMIN — Medication 125 MILLIGRAM(S): at 10:10

## 2024-12-16 NOTE — ED PROVIDER NOTE - CLINICAL SUMMARY MEDICAL DECISION MAKING FREE TEXT BOX
49-year-old female presents with acute asthma exacerbation.  Vitals are stable.  Afebrile no respiratory distress.  Pending labs chest x-ray to screen for pneumonia albuterol ipratropium and steroids.  In the setting of stable labs and chest x-ray and a improved exam will send home on 4-day steroid burst and follow-up with pulmonologist.

## 2024-12-16 NOTE — ED ADULT NURSE NOTE - OBJECTIVE STATEMENT
This is a 50 y/o female alert and oriented x 4, speaks Greek, daughter translate for patient, refused  services. Past medical history of Asthma, states meds at home not working. Tachypneic, not in any distress, audible wheezing noted. Cough++, states when she coughs it triggers her chest pain. Not in any distress. IV initiated on right AC g 20 blood work sent to lab, due meds given. Nebulizers on going. Bed in lowest position, side rails up. Waiting for results.

## 2024-12-16 NOTE — ED PROVIDER NOTE - NSFOLLOWUPINSTRUCTIONS_ED_ALL_ED_FT
Take all medications as prescribed.     We gave you a spacer to use with your inhaler.     Take prednisone 40mg once a day for three days.     Seek immediate medical assistance for any new or worsening symptoms. If you have issues obtaining follow up, please call: [phone] or [phone]  to obtain a doctor or specialist who takes your insurance in your area.     Follow up with your Lung doctor.

## 2024-12-16 NOTE — ED PROVIDER NOTE - PATIENT PORTAL LINK FT
You can access the FollowMyHealth Patient Portal offered by [facility] by registering at the following website: [email or web address]. By joining [employer]’s FollowMyHealth portal, you will also be able to view your health information using other applications (apps) compatible with our system.